# Patient Record
Sex: FEMALE | Race: BLACK OR AFRICAN AMERICAN | Employment: OTHER
[De-identification: names, ages, dates, MRNs, and addresses within clinical notes are randomized per-mention and may not be internally consistent; named-entity substitution may affect disease eponyms.]

---

## 2017-02-28 ENCOUNTER — OFFICE VISIT (OUTPATIENT)
Dept: FAMILY MEDICINE CLINIC | Facility: CLINIC | Age: 57
End: 2017-02-28

## 2017-02-28 VITALS
HEART RATE: 87 BPM | HEIGHT: 70 IN | SYSTOLIC BLOOD PRESSURE: 101 MMHG | BODY MASS INDEX: 18.47 KG/M2 | RESPIRATION RATE: 26 BRPM | OXYGEN SATURATION: 98 % | DIASTOLIC BLOOD PRESSURE: 68 MMHG | WEIGHT: 129 LBS

## 2017-02-28 DIAGNOSIS — G89.29 CHRONIC BACK PAIN, UNSPECIFIED BACK LOCATION, UNSPECIFIED BACK PAIN LATERALITY: ICD-10-CM

## 2017-02-28 DIAGNOSIS — M54.9 CHRONIC BACK PAIN, UNSPECIFIED BACK LOCATION, UNSPECIFIED BACK PAIN LATERALITY: ICD-10-CM

## 2017-02-28 DIAGNOSIS — Z00.00 PHYSICAL EXAM: ICD-10-CM

## 2017-02-28 DIAGNOSIS — N39.0 URINARY TRACT INFECTION WITHOUT HEMATURIA, SITE UNSPECIFIED: Primary | ICD-10-CM

## 2017-02-28 LAB
BILIRUBIN, POC: NORMAL
BLOOD URINE, POC: NORMAL
CLARITY, POC: CLEAR
COLOR, POC: NORMAL
GLUCOSE URINE, POC: NORMAL
KETONES, POC: NORMAL
LEUKOCYTE EST, POC: NORMAL
NITRITE, POC: NORMAL
PH, POC: 6
PROTEIN, POC: NORMAL
SPECIFIC GRAVITY, POC: <1.005
UROBILINOGEN, POC: 0.2

## 2017-02-28 PROCEDURE — 99396 PREV VISIT EST AGE 40-64: CPT | Performed by: NURSE PRACTITIONER

## 2017-02-28 PROCEDURE — 81002 URINALYSIS NONAUTO W/O SCOPE: CPT | Performed by: NURSE PRACTITIONER

## 2017-02-28 RX ORDER — GREEN TEA/HOODIA GORDONII 315-12.5MG
1 CAPSULE ORAL DAILY
Qty: 30 TABLET | Refills: 1 | Status: SHIPPED | OUTPATIENT
Start: 2017-02-28 | End: 2017-08-24 | Stop reason: SDUPTHER

## 2017-02-28 RX ORDER — SULFAMETHOXAZOLE AND TRIMETHOPRIM 200; 40 MG/5ML; MG/5ML
SUSPENSION ORAL
Refills: 0 | COMMUNITY
Start: 2017-02-23 | End: 2019-01-23 | Stop reason: ALTCHOICE

## 2017-02-28 ASSESSMENT — ENCOUNTER SYMPTOMS: BACK PAIN: 1

## 2017-03-05 ASSESSMENT — ENCOUNTER SYMPTOMS
CHEST TIGHTNESS: 0
RHINORRHEA: 0
COUGH: 0
CONSTIPATION: 0
ABDOMINAL PAIN: 0
BLOOD IN STOOL: 0
SINUS PRESSURE: 0
SHORTNESS OF BREATH: 0
DIARRHEA: 0
EYE DISCHARGE: 0

## 2017-03-10 ENCOUNTER — HOSPITAL ENCOUNTER (OUTPATIENT)
Dept: PHYSICAL THERAPY | Age: 57
Setting detail: THERAPIES SERIES
Discharge: HOME | End: 2017-03-10
Payer: COMMERCIAL

## 2017-03-10 PROCEDURE — 97162 PT EVAL MOD COMPLEX 30 MIN: CPT

## 2017-03-15 ASSESSMENT — PAIN SCALES - GENERAL: PAINLEVEL_OUTOF10: 7

## 2017-03-17 ENCOUNTER — HOSPITAL ENCOUNTER (OUTPATIENT)
Dept: PHYSICAL THERAPY | Age: 57
Setting detail: THERAPIES SERIES
Discharge: HOME | End: 2017-03-17
Payer: COMMERCIAL

## 2017-03-17 PROCEDURE — G0283 ELEC STIM OTHER THAN WOUND: HCPCS

## 2017-03-17 PROCEDURE — 97110 THERAPEUTIC EXERCISES: CPT

## 2017-03-17 ASSESSMENT — PAIN DESCRIPTION - LOCATION
LOCATION: BACK
LOCATION_2: HIP

## 2017-03-17 ASSESSMENT — PAIN SCALES - GENERAL: PAINLEVEL_OUTOF10: 5

## 2017-03-17 ASSESSMENT — PAIN DESCRIPTION - ORIENTATION
ORIENTATION: MID
ORIENTATION_2: LEFT

## 2017-03-17 ASSESSMENT — PAIN DESCRIPTION - INTENSITY: RATING_2: 5

## 2017-03-21 ENCOUNTER — HOSPITAL ENCOUNTER (OUTPATIENT)
Dept: PHYSICAL THERAPY | Age: 57
Setting detail: THERAPIES SERIES
Discharge: HOME | End: 2017-03-21
Payer: COMMERCIAL

## 2017-03-21 PROCEDURE — 97140 MANUAL THERAPY 1/> REGIONS: CPT

## 2017-03-21 PROCEDURE — G0283 ELEC STIM OTHER THAN WOUND: HCPCS

## 2017-03-21 PROCEDURE — 97110 THERAPEUTIC EXERCISES: CPT

## 2017-03-21 ASSESSMENT — PAIN DESCRIPTION - LOCATION
LOCATION_2: HIP
LOCATION: BACK

## 2017-03-21 ASSESSMENT — PAIN DESCRIPTION - ORIENTATION
ORIENTATION_2: LEFT
ORIENTATION: MID

## 2017-03-21 ASSESSMENT — PAIN DESCRIPTION - INTENSITY: RATING_2: 5

## 2017-03-21 ASSESSMENT — PAIN SCALES - GENERAL: PAINLEVEL_OUTOF10: 6

## 2017-03-23 ENCOUNTER — HOSPITAL ENCOUNTER (OUTPATIENT)
Dept: PHYSICAL THERAPY | Age: 57
Setting detail: THERAPIES SERIES
Discharge: HOME | End: 2017-03-23
Payer: COMMERCIAL

## 2017-03-23 PROCEDURE — G0283 ELEC STIM OTHER THAN WOUND: HCPCS

## 2017-03-23 PROCEDURE — 97110 THERAPEUTIC EXERCISES: CPT

## 2017-03-23 ASSESSMENT — PAIN DESCRIPTION - LOCATION
LOCATION: BACK
LOCATION_2: HIP

## 2017-03-23 ASSESSMENT — PAIN DESCRIPTION - INTENSITY: RATING_2: 5

## 2017-03-23 ASSESSMENT — PAIN DESCRIPTION - ORIENTATION
ORIENTATION: MID
ORIENTATION_2: LEFT

## 2017-03-23 ASSESSMENT — PAIN SCALES - GENERAL: PAINLEVEL_OUTOF10: 7

## 2017-03-28 ENCOUNTER — HOSPITAL ENCOUNTER (OUTPATIENT)
Dept: PHYSICAL THERAPY | Age: 57
Setting detail: THERAPIES SERIES
Discharge: HOME | End: 2017-03-28
Payer: COMMERCIAL

## 2017-03-28 PROCEDURE — G0283 ELEC STIM OTHER THAN WOUND: HCPCS

## 2017-03-28 PROCEDURE — 97110 THERAPEUTIC EXERCISES: CPT

## 2017-03-28 PROCEDURE — 97140 MANUAL THERAPY 1/> REGIONS: CPT

## 2017-03-29 ASSESSMENT — PAIN DESCRIPTION - ORIENTATION
ORIENTATION_2: LEFT
ORIENTATION: MID

## 2017-03-29 ASSESSMENT — PAIN DESCRIPTION - INTENSITY: RATING_2: 2

## 2017-03-29 ASSESSMENT — PAIN SCALES - GENERAL: PAINLEVEL_OUTOF10: 6

## 2017-03-29 ASSESSMENT — PAIN DESCRIPTION - LOCATION
LOCATION_2: HIP
LOCATION: BACK

## 2017-03-30 ENCOUNTER — HOSPITAL ENCOUNTER (OUTPATIENT)
Dept: PHYSICAL THERAPY | Age: 57
Setting detail: THERAPIES SERIES
Discharge: HOME | End: 2017-03-30
Payer: COMMERCIAL

## 2017-03-30 PROCEDURE — 97110 THERAPEUTIC EXERCISES: CPT

## 2017-03-30 PROCEDURE — G0283 ELEC STIM OTHER THAN WOUND: HCPCS

## 2017-03-30 ASSESSMENT — PAIN SCALES - GENERAL: PAINLEVEL_OUTOF10: 6

## 2017-03-30 ASSESSMENT — PAIN DESCRIPTION - LOCATION
LOCATION: BACK
LOCATION_2: HIP

## 2017-03-30 ASSESSMENT — PAIN DESCRIPTION - INTENSITY: RATING_2: 2

## 2017-03-30 ASSESSMENT — PAIN DESCRIPTION - ORIENTATION
ORIENTATION: MID
ORIENTATION_2: LEFT

## 2017-04-05 ENCOUNTER — HOSPITAL ENCOUNTER (OUTPATIENT)
Dept: PHYSICAL THERAPY | Age: 57
Setting detail: THERAPIES SERIES
Discharge: HOME | End: 2017-04-05
Payer: MEDICARE

## 2017-04-05 PROCEDURE — 97110 THERAPEUTIC EXERCISES: CPT

## 2017-04-05 PROCEDURE — 97032 APPL MODALITY 1+ESTIM EA 15: CPT

## 2017-04-05 PROCEDURE — G0283 ELEC STIM OTHER THAN WOUND: HCPCS

## 2017-04-06 ENCOUNTER — HOSPITAL ENCOUNTER (OUTPATIENT)
Dept: PHYSICAL THERAPY | Age: 57
Setting detail: THERAPIES SERIES
End: 2017-04-06
Payer: MEDICARE

## 2017-04-06 ASSESSMENT — PAIN DESCRIPTION - LOCATION
LOCATION_2: HIP
LOCATION: BACK

## 2017-04-06 ASSESSMENT — PAIN DESCRIPTION - ORIENTATION
ORIENTATION_2: LEFT
ORIENTATION: MID

## 2017-04-06 ASSESSMENT — PAIN SCALES - GENERAL: PAINLEVEL_OUTOF10: 5

## 2017-04-06 ASSESSMENT — PAIN DESCRIPTION - INTENSITY: RATING_2: 4

## 2017-04-11 ENCOUNTER — HOSPITAL ENCOUNTER (OUTPATIENT)
Dept: PHYSICAL THERAPY | Age: 57
Setting detail: THERAPIES SERIES
Discharge: HOME | End: 2017-04-11
Payer: MEDICARE

## 2017-04-11 PROCEDURE — G0283 ELEC STIM OTHER THAN WOUND: HCPCS

## 2017-04-11 PROCEDURE — 97110 THERAPEUTIC EXERCISES: CPT

## 2017-04-12 ASSESSMENT — PAIN DESCRIPTION - LOCATION
LOCATION_2: HIP
LOCATION: BACK

## 2017-04-12 ASSESSMENT — PAIN DESCRIPTION - ORIENTATION
ORIENTATION_2: LEFT
ORIENTATION: MID

## 2017-04-12 ASSESSMENT — PAIN SCALES - GENERAL: PAINLEVEL_OUTOF10: 5

## 2017-04-12 ASSESSMENT — PAIN DESCRIPTION - INTENSITY: RATING_2: 4

## 2017-04-13 ENCOUNTER — HOSPITAL ENCOUNTER (OUTPATIENT)
Dept: PHYSICAL THERAPY | Age: 57
Setting detail: THERAPIES SERIES
Discharge: HOME | End: 2017-04-13
Payer: MEDICARE

## 2017-04-13 PROCEDURE — 97110 THERAPEUTIC EXERCISES: CPT

## 2017-04-13 PROCEDURE — G0283 ELEC STIM OTHER THAN WOUND: HCPCS

## 2017-04-13 PROCEDURE — 97140 MANUAL THERAPY 1/> REGIONS: CPT

## 2017-04-17 ASSESSMENT — PAIN SCALES - GENERAL: PAINLEVEL_OUTOF10: 5

## 2017-04-17 ASSESSMENT — PAIN DESCRIPTION - ORIENTATION
ORIENTATION_2: LEFT
ORIENTATION: MID

## 2017-04-17 ASSESSMENT — PAIN DESCRIPTION - LOCATION
LOCATION_2: HIP
LOCATION: BACK

## 2017-04-17 ASSESSMENT — PAIN DESCRIPTION - INTENSITY: RATING_2: 4

## 2017-04-19 ENCOUNTER — HOSPITAL ENCOUNTER (OUTPATIENT)
Dept: PHYSICAL THERAPY | Age: 57
Setting detail: THERAPIES SERIES
Discharge: HOME | End: 2017-04-19
Payer: MEDICARE

## 2017-04-21 ENCOUNTER — HOSPITAL ENCOUNTER (OUTPATIENT)
Dept: PHYSICAL THERAPY | Age: 57
Setting detail: THERAPIES SERIES
Discharge: HOME | End: 2017-04-21
Payer: MEDICARE

## 2017-04-21 PROCEDURE — 97110 THERAPEUTIC EXERCISES: CPT

## 2017-04-21 PROCEDURE — G0283 ELEC STIM OTHER THAN WOUND: HCPCS

## 2017-04-24 ASSESSMENT — PAIN DESCRIPTION - ORIENTATION
ORIENTATION: MID
ORIENTATION_2: LEFT

## 2017-04-24 ASSESSMENT — PAIN DESCRIPTION - LOCATION
LOCATION: BACK
LOCATION_2: HIP

## 2017-04-24 ASSESSMENT — PAIN DESCRIPTION - INTENSITY: RATING_2: 3

## 2017-04-24 ASSESSMENT — PAIN SCALES - GENERAL: PAINLEVEL_OUTOF10: 6

## 2017-04-27 ENCOUNTER — TELEPHONE (OUTPATIENT)
Dept: FAMILY MEDICINE CLINIC | Age: 57
End: 2017-04-27

## 2017-04-28 ENCOUNTER — HOSPITAL ENCOUNTER (OUTPATIENT)
Dept: PHYSICAL THERAPY | Age: 57
Setting detail: THERAPIES SERIES
Discharge: HOME | End: 2017-04-28
Payer: MEDICARE

## 2017-04-28 PROCEDURE — 97110 THERAPEUTIC EXERCISES: CPT

## 2017-04-28 PROCEDURE — G0283 ELEC STIM OTHER THAN WOUND: HCPCS

## 2017-05-01 ASSESSMENT — PAIN DESCRIPTION - INTENSITY: RATING_2: 2

## 2017-05-01 ASSESSMENT — PAIN DESCRIPTION - LOCATION
LOCATION: BACK
LOCATION_2: HIP

## 2017-05-01 ASSESSMENT — PAIN DESCRIPTION - ORIENTATION
ORIENTATION_2: LEFT
ORIENTATION: MID

## 2017-05-01 ASSESSMENT — PAIN SCALES - GENERAL: PAINLEVEL_OUTOF10: 6

## 2017-05-02 ENCOUNTER — HOSPITAL ENCOUNTER (OUTPATIENT)
Dept: PHYSICAL THERAPY | Age: 57
Setting detail: THERAPIES SERIES
End: 2017-05-02
Payer: MEDICARE

## 2017-05-03 ENCOUNTER — HOSPITAL ENCOUNTER (OUTPATIENT)
Dept: PHYSICAL THERAPY | Age: 57
Setting detail: THERAPIES SERIES
Discharge: HOME | End: 2017-05-03
Payer: MEDICARE

## 2017-05-03 PROCEDURE — G0283 ELEC STIM OTHER THAN WOUND: HCPCS

## 2017-05-03 PROCEDURE — 97110 THERAPEUTIC EXERCISES: CPT

## 2017-05-04 ENCOUNTER — HOSPITAL ENCOUNTER (OUTPATIENT)
Dept: PHYSICAL THERAPY | Age: 57
Setting detail: THERAPIES SERIES
Discharge: HOME | End: 2017-05-04
Payer: MEDICARE

## 2017-05-04 PROCEDURE — 97110 THERAPEUTIC EXERCISES: CPT

## 2017-05-04 PROCEDURE — G0283 ELEC STIM OTHER THAN WOUND: HCPCS

## 2017-05-04 ASSESSMENT — PAIN DESCRIPTION - LOCATION
LOCATION: BACK
LOCATION_2: HIP
LOCATION: BACK
LOCATION_2: HIP

## 2017-05-04 ASSESSMENT — PAIN SCALES - GENERAL
PAINLEVEL_OUTOF10: 7
PAINLEVEL_OUTOF10: 5

## 2017-05-04 ASSESSMENT — PAIN DESCRIPTION - INTENSITY
RATING_2: 2
RATING_2: 2

## 2017-05-04 ASSESSMENT — PAIN DESCRIPTION - ORIENTATION
ORIENTATION_2: LEFT
ORIENTATION: MID
ORIENTATION: MID
ORIENTATION_2: LEFT

## 2017-05-09 ENCOUNTER — HOSPITAL ENCOUNTER (OUTPATIENT)
Dept: PHYSICAL THERAPY | Age: 57
Setting detail: THERAPIES SERIES
Discharge: HOME | End: 2017-05-09
Payer: MEDICARE

## 2017-05-09 PROCEDURE — G0283 ELEC STIM OTHER THAN WOUND: HCPCS

## 2017-05-09 PROCEDURE — 97110 THERAPEUTIC EXERCISES: CPT

## 2017-05-09 ASSESSMENT — PAIN DESCRIPTION - ORIENTATION
ORIENTATION: MID
ORIENTATION_2: LEFT

## 2017-05-09 ASSESSMENT — PAIN DESCRIPTION - LOCATION
LOCATION: BACK
LOCATION_2: HIP

## 2017-05-09 ASSESSMENT — PAIN DESCRIPTION - INTENSITY: RATING_2: 2

## 2017-05-09 ASSESSMENT — PAIN SCALES - GENERAL: PAINLEVEL_OUTOF10: 6

## 2017-05-09 ASSESSMENT — PAIN DESCRIPTION - DESCRIPTORS: DESCRIPTORS_2: ACHING

## 2017-05-11 ENCOUNTER — HOSPITAL ENCOUNTER (OUTPATIENT)
Dept: PHYSICAL THERAPY | Age: 57
Setting detail: THERAPIES SERIES
Discharge: HOME | End: 2017-05-11
Payer: MEDICARE

## 2017-05-11 PROCEDURE — 97110 THERAPEUTIC EXERCISES: CPT

## 2017-05-11 PROCEDURE — G0283 ELEC STIM OTHER THAN WOUND: HCPCS

## 2017-05-11 ASSESSMENT — PAIN DESCRIPTION - ORIENTATION
ORIENTATION: MID
ORIENTATION_2: LEFT

## 2017-05-11 ASSESSMENT — PAIN SCALES - GENERAL: PAINLEVEL_OUTOF10: 7

## 2017-05-11 ASSESSMENT — PAIN DESCRIPTION - LOCATION
LOCATION: BACK
LOCATION_2: HIP

## 2017-05-11 ASSESSMENT — PAIN DESCRIPTION - INTENSITY: RATING_2: 5

## 2017-05-11 ASSESSMENT — PAIN DESCRIPTION - DESCRIPTORS: DESCRIPTORS_2: ACHING

## 2017-05-16 ENCOUNTER — HOSPITAL ENCOUNTER (OUTPATIENT)
Dept: PHYSICAL THERAPY | Age: 57
Setting detail: THERAPIES SERIES
Discharge: HOME | End: 2017-05-16
Payer: MEDICARE

## 2017-05-16 PROCEDURE — G0283 ELEC STIM OTHER THAN WOUND: HCPCS

## 2017-05-16 PROCEDURE — 97110 THERAPEUTIC EXERCISES: CPT

## 2017-05-16 ASSESSMENT — PAIN DESCRIPTION - LOCATION
LOCATION: BACK
LOCATION_2: HIP

## 2017-05-16 ASSESSMENT — PAIN DESCRIPTION - ORIENTATION
ORIENTATION_2: LEFT
ORIENTATION: MID

## 2017-05-16 ASSESSMENT — PAIN DESCRIPTION - INTENSITY: RATING_2: 4

## 2017-05-16 ASSESSMENT — PAIN SCALES - GENERAL: PAINLEVEL_OUTOF10: 7

## 2017-05-18 ENCOUNTER — HOSPITAL ENCOUNTER (OUTPATIENT)
Dept: PHYSICAL THERAPY | Age: 57
Setting detail: THERAPIES SERIES
Discharge: HOME | End: 2017-05-18
Payer: MEDICARE

## 2017-06-28 RX ORDER — IBUPROFEN 800 MG/1
TABLET ORAL
Qty: 90 TABLET | Refills: 3 | Status: SHIPPED | OUTPATIENT
Start: 2017-06-28 | End: 2019-01-23 | Stop reason: SDUPTHER

## 2017-11-02 RX ORDER — FLUTICASONE PROPIONATE 50 MCG
SPRAY, SUSPENSION (ML) NASAL
Qty: 1 BOTTLE | Refills: 0 | Status: SHIPPED | OUTPATIENT
Start: 2017-11-02 | End: 2018-03-26 | Stop reason: SDUPTHER

## 2017-11-02 NOTE — TELEPHONE ENCOUNTER
LAST APPT WAS 2/28/17  PT NEEDS APPT  PUT IN FOR 1 REFILL ONLY    Next Visit Date:  No future appointments.     Health Maintenance   Topic Date Due    Hepatitis C screen  1960    DTaP/Tdap/Td vaccine (1 - Tdap) 01/01/1981    Flu vaccine (1) 09/01/2017    Breast cancer screen  06/13/2018    Lipid screen  07/09/2020    Colon cancer screen colonoscopy  08/30/2023    HIV screen  Completed       Hemoglobin A1C (%)   Date Value   04/30/2013 5.5             ( goal A1C is < 7)   No results found for: LABMICR  LDL Cholesterol (mg/dL)   Date Value   07/09/2015 115       (goal LDL is <100)   AST (U/L)   Date Value   07/09/2015 22     ALT (U/L)   Date Value   07/09/2015 22     BUN (mg/dL)   Date Value   11/19/2015 18     BP Readings from Last 3 Encounters:   02/28/17 101/68   06/13/16 124/71   05/16/16 110/68          (goal 120/80)    All Future Testing planned in [facility]  Lab Frequency Next Occurrence               Patient Active Problem List:     Anemia     Encounter for blood transfusion     Hyperlipidemia     Back pain     Hip pain     Degeneration of lumbar or lumbosacral intervertebral disc     Lumbar spondylosis     Lumbar facet arthropathy     Chronic low back pain     Left hip pain     Spondylosis of lumbar region without myelopathy or radiculopathy     Chronic bilateral low back pain without sciatica     Encounter for pain management planning     Ganglion of hip

## 2017-11-06 RX ORDER — ERYTHROMYCIN 5 MG/G
OINTMENT OPHTHALMIC
Qty: 3.5 G | Refills: 0 | Status: SHIPPED | OUTPATIENT
Start: 2017-11-06 | End: 2019-08-06 | Stop reason: ALTCHOICE

## 2017-11-06 NOTE — TELEPHONE ENCOUNTER
Last visit 2/28/17  Next visit 11/7/17      Next Visit Date:  Future Appointments  Date Time Provider Derrell Castro   11/7/2017 1:30 PM Wesley King CNP [facility] [address] Maintenance   Topic Date Due    Hepatitis C screen  1960    DTaP/Tdap/Td vaccine (1 - Tdap) 01/01/1981    Flu vaccine (1) 09/01/2017    Breast cancer screen  06/13/2018    Lipid screen  07/09/2020    Colon cancer screen colonoscopy  08/30/2023    HIV screen  Completed       Hemoglobin A1C (%)   Date Value   04/30/2013 5.5             ( goal A1C is < 7)   No results found for: LABMICR  LDL Cholesterol (mg/dL)   Date Value   07/09/2015 115       (goal LDL is <100)   AST (U/L)   Date Value   07/09/2015 22     ALT (U/L)   Date Value   07/09/2015 22     BUN (mg/dL)   Date Value   11/19/2015 18     BP Readings from Last 3 Encounters:   02/28/17 101/68   06/13/16 124/71   05/16/16 110/68          (goal 120/80)    All Future Testing planned in [facility]  Lab Frequency Next Occurrence               Patient Active Problem List:     Anemia     Encounter for blood transfusion     Hyperlipidemia     Back pain     Hip pain     Degeneration of lumbar or lumbosacral intervertebral disc     Lumbar spondylosis     Lumbar facet arthropathy     Chronic low back pain     Left hip pain     Spondylosis of lumbar region without myelopathy or radiculopathy     Chronic bilateral low back pain without sciatica     Encounter for pain management planning     Ganglion of hip

## 2017-11-07 ENCOUNTER — OFFICE VISIT (OUTPATIENT)
Dept: FAMILY MEDICINE CLINIC | Age: 57
End: 2017-11-07
Payer: COMMERCIAL

## 2017-11-07 VITALS
HEART RATE: 70 BPM | DIASTOLIC BLOOD PRESSURE: 70 MMHG | RESPIRATION RATE: 16 BRPM | HEIGHT: 70 IN | TEMPERATURE: 97.2 F | BODY MASS INDEX: 19.13 KG/M2 | SYSTOLIC BLOOD PRESSURE: 100 MMHG | WEIGHT: 133.6 LBS | OXYGEN SATURATION: 100 %

## 2017-11-07 DIAGNOSIS — M54.50 CHRONIC BILATERAL LOW BACK PAIN WITHOUT SCIATICA: ICD-10-CM

## 2017-11-07 DIAGNOSIS — M67.452: ICD-10-CM

## 2017-11-07 DIAGNOSIS — G89.29 CHRONIC BILATERAL LOW BACK PAIN WITHOUT SCIATICA: ICD-10-CM

## 2017-11-07 DIAGNOSIS — M51.37 DEGENERATION OF LUMBAR OR LUMBOSACRAL INTERVERTEBRAL DISC: Primary | ICD-10-CM

## 2017-11-07 DIAGNOSIS — M25.552 ARTHRALGIA OF LEFT HIP: ICD-10-CM

## 2017-11-07 DIAGNOSIS — R30.0 DYSURIA: ICD-10-CM

## 2017-11-07 LAB
BILIRUBIN, POC: NORMAL
BLOOD URINE, POC: NORMAL
CLARITY, POC: CLEAR
COLOR, POC: NORMAL
GLUCOSE URINE, POC: NORMAL
KETONES, POC: NORMAL
LEUKOCYTE EST, POC: NORMAL
NITRITE, POC: NORMAL
PH, POC: 6.5
PROTEIN, POC: NORMAL
SPECIFIC GRAVITY, POC: 1.01
UROBILINOGEN, POC: 1

## 2017-11-07 PROCEDURE — 3014F SCREEN MAMMO DOC REV: CPT | Performed by: NURSE PRACTITIONER

## 2017-11-07 PROCEDURE — 1036F TOBACCO NON-USER: CPT | Performed by: NURSE PRACTITIONER

## 2017-11-07 PROCEDURE — G8427 DOCREV CUR MEDS BY ELIG CLIN: HCPCS | Performed by: NURSE PRACTITIONER

## 2017-11-07 PROCEDURE — 3017F COLORECTAL CA SCREEN DOC REV: CPT | Performed by: NURSE PRACTITIONER

## 2017-11-07 PROCEDURE — G8484 FLU IMMUNIZE NO ADMIN: HCPCS | Performed by: NURSE PRACTITIONER

## 2017-11-07 PROCEDURE — G8420 CALC BMI NORM PARAMETERS: HCPCS | Performed by: NURSE PRACTITIONER

## 2017-11-07 PROCEDURE — 81003 URINALYSIS AUTO W/O SCOPE: CPT | Performed by: NURSE PRACTITIONER

## 2017-11-07 PROCEDURE — 99214 OFFICE O/P EST MOD 30 MIN: CPT | Performed by: NURSE PRACTITIONER

## 2017-11-07 RX ORDER — CYCLOBENZAPRINE HCL 10 MG
TABLET ORAL
Qty: 90 TABLET | Refills: 1 | Status: SHIPPED | OUTPATIENT
Start: 2017-11-07 | End: 2019-04-03 | Stop reason: SDUPTHER

## 2017-11-07 RX ORDER — TRAMADOL HYDROCHLORIDE 50 MG/1
TABLET ORAL
Qty: 30 TABLET | Refills: 0 | Status: SHIPPED | OUTPATIENT
Start: 2017-11-07 | End: 2019-01-23 | Stop reason: ALTCHOICE

## 2017-11-07 ASSESSMENT — ENCOUNTER SYMPTOMS: BACK PAIN: 1

## 2017-11-07 NOTE — TELEPHONE ENCOUNTER
pt states she discussesd having lidocaine cream called in. Pt wants to use for LBP and pain in left hip. Pt states lidocaine patches were denied by insurance.

## 2017-11-07 NOTE — PROGRESS NOTES
Patient is here for a prescription refill. Today she is experiencing some lower back/left side pain. Pt has been taking ibuprofen 800 for the pain. UA was performed. Pt would like lidocaine patch reordered but I get a message saying its not covered and to try the cream first.  Please try. Pt would like xray of back. No other concerns at this time. Med and pharmacy reviewed. Visit Information    Have you changed or started any medications since your last visit including any over-the-counter medicines, vitamins, or herbal medicines? no   Have you stopped taking any of your medications? Is so, why? -  no  Are you having any side effects from any of your medications? - no    Have you seen any other physician or provider since your last visit?  no   Have you had any other diagnostic tests since your last visit?  no   Have you been seen in the emergency room and/or had an admission in a hospital since we last saw you?  yes - urgent care Oct   Have you had your routine dental cleaning in the past 6 months?  no     Do you have an active MyChart account? If no, what is the barrier?   yes    Patient Care Team:  Wesley King CNP as PCP - General (Certified Nurse Practitioner)  Yocasta Malik MD as Consulting Physician (Gastroenterology)  Kiersten Malin MD as Surgeon (Orthopedic Surgery)  Jade Bee MD    Medical History Review  Past Medical, Family, and Social History reviewed and does not contribute to the patient presenting condition    Health Maintenance   Topic Date Due    Hepatitis C screen  1960    DTaP/Tdap/Td vaccine (1 - Tdap) 01/01/1981    Flu vaccine (1) 09/01/2017    Breast cancer screen  06/13/2018    Lipid screen  07/09/2020    Colon cancer screen colonoscopy  08/30/2023    HIV screen  Completed

## 2017-11-08 RX ORDER — LIDOCAINE 40 MG/G
CREAM TOPICAL
Qty: 1 TUBE | Refills: 1 | Status: SHIPPED | OUTPATIENT
Start: 2017-11-08 | End: 2018-02-12 | Stop reason: SDUPTHER

## 2017-11-09 ENCOUNTER — TELEPHONE (OUTPATIENT)
Dept: FAMILY MEDICINE CLINIC | Age: 57
End: 2017-11-09

## 2017-11-21 ENCOUNTER — HOSPITAL ENCOUNTER (OUTPATIENT)
Dept: MRI IMAGING | Age: 57
Discharge: HOME | End: 2017-11-21
Payer: COMMERCIAL

## 2017-11-21 DIAGNOSIS — M67.452: ICD-10-CM

## 2017-11-21 DIAGNOSIS — M25.552 ARTHRALGIA OF LEFT HIP: ICD-10-CM

## 2017-11-21 PROCEDURE — 73721 MRI JNT OF LWR EXTRE W/O DYE: CPT

## 2017-12-01 DIAGNOSIS — M47.816 LUMBAR SPONDYLOSIS: Primary | ICD-10-CM

## 2017-12-01 DIAGNOSIS — M51.37 DEGENERATION OF LUMBAR OR LUMBOSACRAL INTERVERTEBRAL DISC: ICD-10-CM

## 2017-12-11 ENCOUNTER — TELEPHONE (OUTPATIENT)
Dept: FAMILY MEDICINE CLINIC | Age: 57
End: 2017-12-11

## 2017-12-15 DIAGNOSIS — M25.552 ARTHRALGIA OF LEFT HIP: ICD-10-CM

## 2017-12-15 DIAGNOSIS — M51.37 DEGENERATION OF LUMBAR OR LUMBOSACRAL INTERVERTEBRAL DISC: ICD-10-CM

## 2017-12-19 RX ORDER — CAPSAICIN 0.025 %
CREAM (GRAM) TOPICAL
Qty: 1 TUBE | Refills: 5 | Status: SHIPPED | OUTPATIENT
Start: 2017-12-19 | End: 2019-05-28 | Stop reason: SDUPTHER

## 2018-02-13 RX ORDER — LIDOCAINE 40 MG/G
CREAM TOPICAL
Qty: 15 G | Refills: 1 | Status: SHIPPED | OUTPATIENT
Start: 2018-02-13 | End: 2018-11-03 | Stop reason: SDUPTHER

## 2018-03-26 RX ORDER — FLUTICASONE PROPIONATE 50 MCG
SPRAY, SUSPENSION (ML) NASAL
Qty: 16 G | Refills: 5 | Status: SHIPPED | OUTPATIENT
Start: 2018-03-26 | End: 2019-04-02 | Stop reason: SDUPTHER

## 2018-03-26 NOTE — TELEPHONE ENCOUNTER
Last seen 11/7/17    Next Visit Date:  No future appointments.     Health Maintenance   Topic Date Due    Hepatitis C screen  1960    Shingles Vaccine (1 of 2 - 2 Dose Series) 01/01/2012    DTaP/Tdap/Td vaccine (1 - Tdap) 11/07/2018 (Originally 1/1/1981)    Flu vaccine (1) 11/07/2018 (Originally 9/1/2017)    Breast cancer screen  06/13/2018    Lipid screen  07/09/2020    Colon cancer screen colonoscopy  08/30/2023    HIV screen  Completed       Hemoglobin A1C (%)   Date Value   04/30/2013 5.5             ( goal A1C is < 7)   No results found for: LABMICR  LDL Cholesterol (mg/dL)   Date Value   07/09/2015 115       (goal LDL is <100)   AST (U/L)   Date Value   07/09/2015 22     ALT (U/L)   Date Value   07/09/2015 22     BUN (mg/dL)   Date Value   11/19/2015 18     BP Readings from Last 3 Encounters:   11/07/17 100/70   02/28/17 101/68   06/13/16 124/71          (goal 120/80)    All Future Testing planned in [facility]  Lab Frequency Next Occurrence               Patient Active Problem List:     Anemia     Encounter for blood transfusion     Hyperlipidemia     Back pain     Hip pain     Degeneration of lumbar or lumbosacral intervertebral disc     Lumbar spondylosis     Lumbar facet arthropathy     Chronic low back pain     Left hip pain     Spondylosis of lumbar region without myelopathy or radiculopathy     Chronic bilateral low back pain without sciatica     Encounter for pain management planning     Ganglion of hip

## 2018-04-17 ENCOUNTER — TELEPHONE (OUTPATIENT)
Dept: FAMILY MEDICINE CLINIC | Age: 58
End: 2018-04-17

## 2018-04-17 DIAGNOSIS — Z12.31 VISIT FOR SCREENING MAMMOGRAM: Primary | ICD-10-CM

## 2018-05-07 ENCOUNTER — HOSPITAL ENCOUNTER (OUTPATIENT)
Dept: WOMENS IMAGING | Age: 58
Discharge: HOME | End: 2018-05-09
Payer: COMMERCIAL

## 2018-05-07 DIAGNOSIS — Z12.31 VISIT FOR SCREENING MAMMOGRAM: ICD-10-CM

## 2018-05-07 PROCEDURE — 77067 SCR MAMMO BI INCL CAD: CPT

## 2018-06-15 RX ORDER — LORATADINE 10 MG/1
TABLET ORAL
Qty: 30 TABLET | Refills: 5 | Status: SHIPPED | OUTPATIENT
Start: 2018-06-15 | End: 2020-03-13 | Stop reason: ALTCHOICE

## 2018-06-20 ENCOUNTER — OFFICE VISIT (OUTPATIENT)
Dept: FAMILY MEDICINE CLINIC | Age: 58
End: 2018-06-20
Payer: COMMERCIAL

## 2018-06-20 VITALS
HEIGHT: 70 IN | WEIGHT: 133.2 LBS | BODY MASS INDEX: 19.07 KG/M2 | SYSTOLIC BLOOD PRESSURE: 112 MMHG | HEART RATE: 67 BPM | DIASTOLIC BLOOD PRESSURE: 72 MMHG

## 2018-06-20 DIAGNOSIS — R63.6 UNDERWEIGHT ON EXAMINATION: ICD-10-CM

## 2018-06-20 DIAGNOSIS — Z00.00 ROUTINE GENERAL MEDICAL EXAMINATION AT A HEALTH CARE FACILITY: Primary | ICD-10-CM

## 2018-06-20 PROCEDURE — G0438 PPPS, INITIAL VISIT: HCPCS | Performed by: NURSE PRACTITIONER

## 2018-06-20 ASSESSMENT — LIFESTYLE VARIABLES: HOW OFTEN DO YOU HAVE A DRINK CONTAINING ALCOHOL: 0

## 2018-06-20 ASSESSMENT — ANXIETY QUESTIONNAIRES: GAD7 TOTAL SCORE: 4

## 2018-07-19 ENCOUNTER — HOSPITAL ENCOUNTER (OUTPATIENT)
Dept: NUTRITION | Age: 58
Discharge: HOME | End: 2018-07-19
Payer: COMMERCIAL

## 2018-07-19 PROCEDURE — 97802 MEDICAL NUTRITION INDIV IN: CPT | Performed by: DIETITIAN, REGISTERED

## 2018-07-27 ENCOUNTER — TELEPHONE (OUTPATIENT)
Dept: FAMILY MEDICINE CLINIC | Age: 58
End: 2018-07-27

## 2018-08-07 DIAGNOSIS — M54.50 CHRONIC BILATERAL LOW BACK PAIN WITHOUT SCIATICA: Primary | ICD-10-CM

## 2018-08-07 DIAGNOSIS — G89.29 CHRONIC BILATERAL LOW BACK PAIN WITHOUT SCIATICA: Primary | ICD-10-CM

## 2018-10-10 ENCOUNTER — TELEPHONE (OUTPATIENT)
Dept: FAMILY MEDICINE CLINIC | Age: 58
End: 2018-10-10

## 2019-01-23 ENCOUNTER — OFFICE VISIT (OUTPATIENT)
Dept: FAMILY MEDICINE CLINIC | Age: 59
End: 2019-01-23
Payer: COMMERCIAL

## 2019-01-23 ENCOUNTER — HOSPITAL ENCOUNTER (OUTPATIENT)
Age: 59
Setting detail: SPECIMEN
Discharge: HOME | End: 2019-01-23
Payer: COMMERCIAL

## 2019-01-23 VITALS
OXYGEN SATURATION: 99 % | BODY MASS INDEX: 18.55 KG/M2 | TEMPERATURE: 97.8 F | SYSTOLIC BLOOD PRESSURE: 100 MMHG | WEIGHT: 131 LBS | DIASTOLIC BLOOD PRESSURE: 60 MMHG | HEART RATE: 80 BPM

## 2019-01-23 DIAGNOSIS — R20.2 NOTALGIA PARESTHETICA: ICD-10-CM

## 2019-01-23 DIAGNOSIS — R53.83 FATIGUE, UNSPECIFIED TYPE: ICD-10-CM

## 2019-01-23 DIAGNOSIS — M47.816 SPONDYLOSIS OF LUMBAR REGION WITHOUT MYELOPATHY OR RADICULOPATHY: ICD-10-CM

## 2019-01-23 DIAGNOSIS — M19.049 ARTHRITIS PAIN OF HAND: ICD-10-CM

## 2019-01-23 DIAGNOSIS — R55 SYNCOPE, UNSPECIFIED SYNCOPE TYPE: Primary | ICD-10-CM

## 2019-01-23 DIAGNOSIS — G89.29 CHRONIC BILATERAL LOW BACK PAIN WITHOUT SCIATICA: ICD-10-CM

## 2019-01-23 DIAGNOSIS — E78.5 HYPERLIPIDEMIA, UNSPECIFIED HYPERLIPIDEMIA TYPE: ICD-10-CM

## 2019-01-23 DIAGNOSIS — Z79.899 MEDICATION MANAGEMENT: ICD-10-CM

## 2019-01-23 DIAGNOSIS — M54.50 CHRONIC BILATERAL LOW BACK PAIN WITHOUT SCIATICA: ICD-10-CM

## 2019-01-23 DIAGNOSIS — R55 SYNCOPE, UNSPECIFIED SYNCOPE TYPE: ICD-10-CM

## 2019-01-23 DIAGNOSIS — M25.552 LEFT HIP PAIN: ICD-10-CM

## 2019-01-23 LAB
ALBUMIN SERPL-MCNC: 4.7 G/DL (ref 3.5–5.2)
ALBUMIN/GLOBULIN RATIO: 1.5 (ref 1–2.5)
ALP BLD-CCNC: 67 U/L (ref 35–104)
ALT SERPL-CCNC: 24 U/L (ref 5–33)
ANION GAP SERPL CALCULATED.3IONS-SCNC: 23 MMOL/L (ref 9–17)
AST SERPL-CCNC: 27 U/L
BILIRUB SERPL-MCNC: 1.14 MG/DL (ref 0.3–1.2)
BUN BLDV-MCNC: 17 MG/DL (ref 6–20)
BUN/CREAT BLD: ABNORMAL (ref 9–20)
C-REACTIVE PROTEIN: <0.3 MG/L (ref 0–5)
CALCIUM SERPL-MCNC: 9.7 MG/DL (ref 8.6–10.4)
CHLORIDE BLD-SCNC: 102 MMOL/L (ref 98–107)
CHOLESTEROL/HDL RATIO: 2.7
CHOLESTEROL: 235 MG/DL
CO2: 23 MMOL/L (ref 20–31)
CREAT SERPL-MCNC: 0.79 MG/DL (ref 0.5–0.9)
GFR AFRICAN AMERICAN: >60 ML/MIN
GFR NON-AFRICAN AMERICAN: >60 ML/MIN
GFR SERPL CREATININE-BSD FRML MDRD: ABNORMAL ML/MIN/{1.73_M2}
GFR SERPL CREATININE-BSD FRML MDRD: ABNORMAL ML/MIN/{1.73_M2}
GLUCOSE BLD-MCNC: 72 MG/DL (ref 70–99)
HCT VFR BLD CALC: 42.9 % (ref 36.3–47.1)
HDLC SERPL-MCNC: 86 MG/DL
HEMOGLOBIN: 13.5 G/DL (ref 11.9–15.1)
LDL CHOLESTEROL: 129 MG/DL (ref 0–130)
MCH RBC QN AUTO: 29.9 PG (ref 25.2–33.5)
MCHC RBC AUTO-ENTMCNC: 31.5 G/DL (ref 28.4–34.8)
MCV RBC AUTO: 94.9 FL (ref 82.6–102.9)
NRBC AUTOMATED: 0 PER 100 WBC
PDW BLD-RTO: 12.4 % (ref 11.8–14.4)
PLATELET # BLD: 161 K/UL (ref 138–453)
PMV BLD AUTO: 12.4 FL (ref 8.1–13.5)
POTASSIUM SERPL-SCNC: 4.5 MMOL/L (ref 3.7–5.3)
RBC # BLD: 4.52 M/UL (ref 3.95–5.11)
SODIUM BLD-SCNC: 148 MMOL/L (ref 135–144)
TOTAL PROTEIN: 7.9 G/DL (ref 6.4–8.3)
TRIGL SERPL-MCNC: 102 MG/DL
TSH SERPL DL<=0.05 MIU/L-ACNC: 1.59 MIU/L (ref 0.3–5)
VLDLC SERPL CALC-MCNC: ABNORMAL MG/DL (ref 1–30)
WBC # BLD: 3.1 K/UL (ref 3.5–11.3)

## 2019-01-23 PROCEDURE — G8427 DOCREV CUR MEDS BY ELIG CLIN: HCPCS | Performed by: NURSE PRACTITIONER

## 2019-01-23 PROCEDURE — 1036F TOBACCO NON-USER: CPT | Performed by: NURSE PRACTITIONER

## 2019-01-23 PROCEDURE — G8484 FLU IMMUNIZE NO ADMIN: HCPCS | Performed by: NURSE PRACTITIONER

## 2019-01-23 PROCEDURE — 99215 OFFICE O/P EST HI 40 MIN: CPT | Performed by: NURSE PRACTITIONER

## 2019-01-23 PROCEDURE — 3017F COLORECTAL CA SCREEN DOC REV: CPT | Performed by: NURSE PRACTITIONER

## 2019-01-23 PROCEDURE — 36415 COLL VENOUS BLD VENIPUNCTURE: CPT | Performed by: NURSE PRACTITIONER

## 2019-01-23 PROCEDURE — G8420 CALC BMI NORM PARAMETERS: HCPCS | Performed by: NURSE PRACTITIONER

## 2019-01-23 ASSESSMENT — PATIENT HEALTH QUESTIONNAIRE - PHQ9
SUM OF ALL RESPONSES TO PHQ9 QUESTIONS 1 & 2: 2
SUM OF ALL RESPONSES TO PHQ QUESTIONS 1-9: 2
1. LITTLE INTEREST OR PLEASURE IN DOING THINGS: 1
2. FEELING DOWN, DEPRESSED OR HOPELESS: 1
SUM OF ALL RESPONSES TO PHQ QUESTIONS 1-9: 2

## 2019-01-23 ASSESSMENT — ENCOUNTER SYMPTOMS
RESPIRATORY NEGATIVE: 1
BACK PAIN: 1
ABDOMINAL PAIN: 1

## 2019-01-24 DIAGNOSIS — E78.00 PURE HYPERCHOLESTEROLEMIA: Primary | ICD-10-CM

## 2019-01-24 RX ORDER — ATORVASTATIN CALCIUM 10 MG/1
10 TABLET, FILM COATED ORAL NIGHTLY
Qty: 90 TABLET | Refills: 1 | Status: SHIPPED | OUTPATIENT
Start: 2019-01-24 | End: 2019-08-06

## 2019-01-28 ENCOUNTER — APPOINTMENT (OUTPATIENT)
Dept: GENERAL RADIOLOGY | Age: 59
End: 2019-01-28
Payer: COMMERCIAL

## 2019-01-28 ENCOUNTER — HOSPITAL ENCOUNTER (EMERGENCY)
Age: 59
Discharge: HOME | End: 2019-01-28
Attending: EMERGENCY MEDICINE
Payer: COMMERCIAL

## 2019-01-28 VITALS
SYSTOLIC BLOOD PRESSURE: 110 MMHG | DIASTOLIC BLOOD PRESSURE: 68 MMHG | BODY MASS INDEX: 18.34 KG/M2 | TEMPERATURE: 97.5 F | WEIGHT: 131 LBS | HEIGHT: 71 IN | HEART RATE: 77 BPM | OXYGEN SATURATION: 100 % | RESPIRATION RATE: 16 BRPM

## 2019-01-28 DIAGNOSIS — R55 SYNCOPE AND COLLAPSE: Primary | ICD-10-CM

## 2019-01-28 DIAGNOSIS — K59.00 CONSTIPATION, UNSPECIFIED CONSTIPATION TYPE: ICD-10-CM

## 2019-01-28 LAB
ABSOLUTE BANDS #: 0.03 K/UL (ref 0–1)
ABSOLUTE EOS #: 0.25 K/UL (ref 0–0.4)
ABSOLUTE IMMATURE GRANULOCYTE: ABNORMAL K/UL (ref 0–0.3)
ABSOLUTE LYMPH #: 1.34 K/UL (ref 1–4.8)
ABSOLUTE MONO #: 0.34 K/UL (ref 0.1–1.3)
ALBUMIN SERPL-MCNC: 4.3 G/DL (ref 3.5–5.2)
ALBUMIN/GLOBULIN RATIO: ABNORMAL (ref 1–2.5)
ALP BLD-CCNC: 58 U/L (ref 35–104)
ALT SERPL-CCNC: 16 U/L (ref 5–33)
ANION GAP SERPL CALCULATED.3IONS-SCNC: 14 MMOL/L (ref 9–17)
AST SERPL-CCNC: 17 U/L
BANDS: 1 % (ref 0–10)
BASOPHILS # BLD: 0 % (ref 0–2)
BASOPHILS ABSOLUTE: 0 K/UL (ref 0–0.2)
BILIRUB SERPL-MCNC: 1.14 MG/DL (ref 0.3–1.2)
BUN BLDV-MCNC: 18 MG/DL (ref 6–20)
BUN/CREAT BLD: ABNORMAL (ref 9–20)
CALCIUM SERPL-MCNC: 9.8 MG/DL (ref 8.6–10.4)
CHLORIDE BLD-SCNC: 100 MMOL/L (ref 98–107)
CO2: 26 MMOL/L (ref 20–31)
CREAT SERPL-MCNC: 0.66 MG/DL (ref 0.5–0.9)
DIFFERENTIAL TYPE: ABNORMAL
EKG ATRIAL RATE: 61 BPM
EKG P AXIS: 80 DEGREES
EKG P-R INTERVAL: 168 MS
EKG Q-T INTERVAL: 396 MS
EKG QRS DURATION: 90 MS
EKG QTC CALCULATION (BAZETT): 398 MS
EKG R AXIS: 87 DEGREES
EKG T AXIS: 78 DEGREES
EKG VENTRICULAR RATE: 61 BPM
EOSINOPHILS RELATIVE PERCENT: 9 % (ref 0–4)
GFR AFRICAN AMERICAN: >60 ML/MIN
GFR NON-AFRICAN AMERICAN: >60 ML/MIN
GFR SERPL CREATININE-BSD FRML MDRD: ABNORMAL ML/MIN/{1.73_M2}
GFR SERPL CREATININE-BSD FRML MDRD: ABNORMAL ML/MIN/{1.73_M2}
GLUCOSE BLD-MCNC: 111 MG/DL (ref 70–99)
HCT VFR BLD CALC: 41.9 % (ref 36–46)
HEMOGLOBIN: 13.9 G/DL (ref 12–16)
IMMATURE GRANULOCYTES: ABNORMAL %
LYMPHOCYTES # BLD: 48 % (ref 24–44)
MCH RBC QN AUTO: 30.1 PG (ref 26–34)
MCHC RBC AUTO-ENTMCNC: 33.3 G/DL (ref 31–37)
MCV RBC AUTO: 90.6 FL (ref 80–100)
MONOCYTES # BLD: 12 % (ref 1–7)
MORPHOLOGY: ABNORMAL
NRBC AUTOMATED: ABNORMAL PER 100 WBC
PDW BLD-RTO: 13.2 % (ref 11.5–14.9)
PLATELET # BLD: 146 K/UL (ref 150–450)
PLATELET ESTIMATE: ABNORMAL
PMV BLD AUTO: 10.1 FL (ref 6–12)
POTASSIUM SERPL-SCNC: 3.6 MMOL/L (ref 3.7–5.3)
RBC # BLD: 4.62 M/UL (ref 4–5.2)
RBC # BLD: ABNORMAL 10*6/UL
SEG NEUTROPHILS: 30 % (ref 36–66)
SEGMENTED NEUTROPHILS ABSOLUTE COUNT: 0.84 K/UL (ref 1.3–9.1)
SODIUM BLD-SCNC: 140 MMOL/L (ref 135–144)
TOTAL PROTEIN: 7.2 G/DL (ref 6.4–8.3)
TROPONIN INTERP: NORMAL
TROPONIN INTERP: NORMAL
TROPONIN T: NORMAL NG/ML
TROPONIN T: NORMAL NG/ML
TROPONIN, HIGH SENSITIVITY: 7 NG/L (ref 0–14)
TROPONIN, HIGH SENSITIVITY: <6 NG/L (ref 0–14)
WBC # BLD: 2.8 K/UL (ref 3.5–11)
WBC # BLD: ABNORMAL 10*3/UL

## 2019-01-28 PROCEDURE — 99284 EMERGENCY DEPT VISIT MOD MDM: CPT

## 2019-01-28 PROCEDURE — 2580000003 HC RX 258: Performed by: EMERGENCY MEDICINE

## 2019-01-28 PROCEDURE — 93005 ELECTROCARDIOGRAM TRACING: CPT

## 2019-01-28 PROCEDURE — 84484 ASSAY OF TROPONIN QUANT: CPT

## 2019-01-28 PROCEDURE — 6370000000 HC RX 637 (ALT 250 FOR IP): Performed by: EMERGENCY MEDICINE

## 2019-01-28 PROCEDURE — 85025 COMPLETE CBC W/AUTO DIFF WBC: CPT

## 2019-01-28 PROCEDURE — 74022 RADEX COMPL AQT ABD SERIES: CPT

## 2019-01-28 PROCEDURE — 36415 COLL VENOUS BLD VENIPUNCTURE: CPT

## 2019-01-28 PROCEDURE — 80053 COMPREHEN METABOLIC PANEL: CPT

## 2019-01-28 RX ORDER — DOCUSATE SODIUM 100 MG/1
100 CAPSULE, LIQUID FILLED ORAL 2 TIMES DAILY
Qty: 30 CAPSULE | Refills: 0 | Status: SHIPPED | OUTPATIENT
Start: 2019-01-28 | End: 2019-09-24

## 2019-01-28 RX ORDER — ACETAMINOPHEN 325 MG/1
650 TABLET ORAL ONCE
Status: COMPLETED | OUTPATIENT
Start: 2019-01-28 | End: 2019-01-28

## 2019-01-28 RX ORDER — 0.9 % SODIUM CHLORIDE 0.9 %
1000 INTRAVENOUS SOLUTION INTRAVENOUS ONCE
Status: COMPLETED | OUTPATIENT
Start: 2019-01-28 | End: 2019-01-28

## 2019-01-28 RX ADMIN — ACETAMINOPHEN 650 MG: 325 TABLET, FILM COATED ORAL at 14:06

## 2019-01-28 RX ADMIN — SODIUM CHLORIDE 1000 ML: 9 INJECTION, SOLUTION INTRAVENOUS at 10:31

## 2019-01-28 ASSESSMENT — PAIN SCALES - GENERAL
PAINLEVEL_OUTOF10: 6
PAINLEVEL_OUTOF10: 5

## 2019-01-28 ASSESSMENT — ENCOUNTER SYMPTOMS
CONSTIPATION: 1
DIARRHEA: 0
NAUSEA: 0
SHORTNESS OF BREATH: 0
VOMITING: 0
ABDOMINAL PAIN: 0

## 2019-03-14 ENCOUNTER — HOSPITAL ENCOUNTER (EMERGENCY)
Age: 59
Discharge: HOME | End: 2019-03-14
Attending: EMERGENCY MEDICINE
Payer: COMMERCIAL

## 2019-03-14 VITALS
OXYGEN SATURATION: 100 % | SYSTOLIC BLOOD PRESSURE: 120 MMHG | HEIGHT: 70 IN | HEART RATE: 78 BPM | TEMPERATURE: 97.9 F | WEIGHT: 130 LBS | RESPIRATION RATE: 16 BRPM | DIASTOLIC BLOOD PRESSURE: 84 MMHG | BODY MASS INDEX: 18.61 KG/M2

## 2019-03-14 DIAGNOSIS — N30.00 ACUTE CYSTITIS WITHOUT HEMATURIA: Primary | ICD-10-CM

## 2019-03-14 LAB
-: ABNORMAL
AMORPHOUS: ABNORMAL
BACTERIA: ABNORMAL
BILIRUBIN URINE: NEGATIVE
CASTS UA: ABNORMAL /LPF (ref 0–8)
COLOR: YELLOW
COMMENT UA: ABNORMAL
CRYSTALS, UA: ABNORMAL /HPF
EPITHELIAL CELLS UA: ABNORMAL /HPF (ref 0–5)
GLUCOSE URINE: NEGATIVE
KETONES, URINE: NEGATIVE
LEUKOCYTE ESTERASE, URINE: ABNORMAL
MUCUS: ABNORMAL
NITRITE, URINE: NEGATIVE
OTHER OBSERVATIONS UA: ABNORMAL
PH UA: 6.5 (ref 5–8)
PROTEIN UA: NEGATIVE
RBC UA: ABNORMAL /HPF (ref 0–4)
RENAL EPITHELIAL, UA: ABNORMAL /HPF
SPECIFIC GRAVITY UA: 1.01 (ref 1–1.03)
TRICHOMONAS: ABNORMAL
TURBIDITY: CLEAR
URINE HGB: ABNORMAL
UROBILINOGEN, URINE: NORMAL
WBC UA: ABNORMAL /HPF (ref 0–5)
YEAST: ABNORMAL

## 2019-03-14 PROCEDURE — 81001 URINALYSIS AUTO W/SCOPE: CPT

## 2019-03-14 PROCEDURE — 87086 URINE CULTURE/COLONY COUNT: CPT

## 2019-03-14 PROCEDURE — 87186 SC STD MICRODIL/AGAR DIL: CPT

## 2019-03-14 PROCEDURE — 6370000000 HC RX 637 (ALT 250 FOR IP): Performed by: EMERGENCY MEDICINE

## 2019-03-14 PROCEDURE — 87088 URINE BACTERIA CULTURE: CPT

## 2019-03-14 PROCEDURE — 99284 EMERGENCY DEPT VISIT MOD MDM: CPT

## 2019-03-14 RX ORDER — CEPHALEXIN 500 MG/1
500 CAPSULE ORAL 4 TIMES DAILY
Qty: 28 CAPSULE | Refills: 0 | Status: SHIPPED | OUTPATIENT
Start: 2019-03-14 | End: 2019-03-21

## 2019-03-14 RX ORDER — CEPHALEXIN 250 MG/1
500 CAPSULE ORAL ONCE
Status: COMPLETED | OUTPATIENT
Start: 2019-03-14 | End: 2019-03-14

## 2019-03-14 RX ORDER — ACETAMINOPHEN 325 MG/1
650 TABLET ORAL ONCE
Status: COMPLETED | OUTPATIENT
Start: 2019-03-14 | End: 2019-03-14

## 2019-03-14 RX ADMIN — ACETAMINOPHEN 650 MG: 325 TABLET ORAL at 15:49

## 2019-03-14 RX ADMIN — CEPHALEXIN 500 MG: 250 CAPSULE ORAL at 16:14

## 2019-03-14 ASSESSMENT — ENCOUNTER SYMPTOMS
BACK PAIN: 0
VOMITING: 0
VOICE CHANGE: 0
CHEST TIGHTNESS: 0
COUGH: 0
SHORTNESS OF BREATH: 0
NAUSEA: 0
ABDOMINAL PAIN: 0
FACIAL SWELLING: 0
TROUBLE SWALLOWING: 0
SINUS PAIN: 0
DIARRHEA: 0

## 2019-03-14 ASSESSMENT — PAIN DESCRIPTION - PAIN TYPE: TYPE: ACUTE PAIN

## 2019-03-14 ASSESSMENT — PAIN DESCRIPTION - LOCATION: LOCATION: FLANK

## 2019-03-14 ASSESSMENT — PAIN DESCRIPTION - ORIENTATION: ORIENTATION: LEFT

## 2019-03-14 ASSESSMENT — PAIN SCALES - GENERAL
PAINLEVEL_OUTOF10: 7
PAINLEVEL_OUTOF10: 7

## 2019-03-14 ASSESSMENT — PAIN DESCRIPTION - DESCRIPTORS: DESCRIPTORS: NUMBNESS

## 2019-03-15 LAB
CULTURE: ABNORMAL
Lab: ABNORMAL
SPECIMEN DESCRIPTION: ABNORMAL

## 2019-03-25 ENCOUNTER — TELEPHONE (OUTPATIENT)
Dept: FAMILY MEDICINE CLINIC | Age: 59
End: 2019-03-25

## 2019-03-25 DIAGNOSIS — M54.5 CHRONIC LOW BACK PAIN, UNSPECIFIED BACK PAIN LATERALITY, WITH SCIATICA PRESENCE UNSPECIFIED: Primary | ICD-10-CM

## 2019-03-25 DIAGNOSIS — G89.29 CHRONIC LOW BACK PAIN, UNSPECIFIED BACK PAIN LATERALITY, WITH SCIATICA PRESENCE UNSPECIFIED: Primary | ICD-10-CM

## 2019-04-01 ENCOUNTER — TELEPHONE (OUTPATIENT)
Dept: FAMILY MEDICINE CLINIC | Age: 59
End: 2019-04-01

## 2019-04-02 NOTE — TELEPHONE ENCOUNTER
Last visit: 1/23/19    Next Visit Date:  Future Appointments   Date Time Provider Derrell Gloria   4/23/2019  2:30 PM ELISHA Bauer - CNP [facility]   5/28/2019  3:00 PM Carol Canseco MD  derm [address] Maintenance   Topic Date Due    Shingles Vaccine (1 of 2) 06/20/2019 (Originally 1/1/2012)    DTaP/Tdap/Td vaccine (1 - Tdap) 01/23/2020 (Originally 1/1/1981)    Flu vaccine (Season Ended) 01/23/2020 (Originally 9/1/2019)    Breast cancer screen  05/07/2020    Colon cancer screen colonoscopy  08/30/2023    Lipid screen  01/23/2024    HIV screen  Completed    Hepatitis C screen  Discontinued       Hemoglobin A1C (%)   Date Value   04/30/2013 5.5             ( goal A1C is < 7)   No results found for: LABMICR  LDL Cholesterol (mg/dL)   Date Value   01/23/2019 129   07/09/2015 115       (goal LDL is <100)   AST (U/L)   Date Value   01/28/2019 17     ALT (U/L)   Date Value   01/28/2019 16     BUN (mg/dL)   Date Value   01/28/2019 18     BP Readings from Last 3 Encounters:   03/14/19 120/84   01/28/19 110/68   01/23/19 100/60          (goal 120/80)    All Future Testing planned in [facility]  Lab Frequency Next Occurrence               Patient Active Problem List:     Anemia     Encounter for blood transfusion     Hyperlipidemia     Back pain     Hip pain     Degeneration of lumbar or lumbosacral intervertebral disc     Lumbar spondylosis     Lumbar facet arthropathy     Chronic low back pain     Left hip pain     Spondylosis of lumbar region without myelopathy or radiculopathy     Chronic bilateral low back pain without sciatica     Encounter for pain management planning     Ganglion of hip     Syncope     Notalgia paresthetica     Arthritis pain of hand     Pure hypercholesterolemia

## 2019-04-03 RX ORDER — CYCLOBENZAPRINE HCL 10 MG
TABLET ORAL
Qty: 90 TABLET | Refills: 1 | Status: SHIPPED | OUTPATIENT
Start: 2019-04-03 | End: 2019-08-06 | Stop reason: ALTCHOICE

## 2019-04-03 RX ORDER — FLUTICASONE PROPIONATE 50 MCG
SPRAY, SUSPENSION (ML) NASAL
Qty: 16 G | Refills: 5 | Status: SHIPPED | OUTPATIENT
Start: 2019-04-03 | End: 2019-09-24 | Stop reason: SDUPTHER

## 2019-04-03 NOTE — TELEPHONE ENCOUNTER
Patient states she has been having constipation. Patinet states she has a BM every 2-3 days. States that she is very bloated.

## 2019-05-22 ENCOUNTER — HOSPITAL ENCOUNTER (OUTPATIENT)
Dept: WOMENS IMAGING | Age: 59
Discharge: HOME | End: 2019-05-24
Payer: COMMERCIAL

## 2019-05-22 DIAGNOSIS — Z12.39 SCREENING BREAST EXAMINATION: ICD-10-CM

## 2019-05-22 PROCEDURE — 77063 BREAST TOMOSYNTHESIS BI: CPT

## 2019-05-28 ENCOUNTER — OFFICE VISIT (OUTPATIENT)
Dept: DERMATOLOGY | Age: 59
End: 2019-05-28
Payer: COMMERCIAL

## 2019-05-28 VITALS
BODY MASS INDEX: 17.92 KG/M2 | HEIGHT: 71 IN | DIASTOLIC BLOOD PRESSURE: 74 MMHG | SYSTOLIC BLOOD PRESSURE: 125 MMHG | OXYGEN SATURATION: 100 % | HEART RATE: 80 BPM | WEIGHT: 128 LBS

## 2019-05-28 DIAGNOSIS — R20.2 NOTALGIA PARESTHETICA: Primary | ICD-10-CM

## 2019-05-28 PROCEDURE — G8427 DOCREV CUR MEDS BY ELIG CLIN: HCPCS | Performed by: DERMATOLOGY

## 2019-05-28 PROCEDURE — 3017F COLORECTAL CA SCREEN DOC REV: CPT | Performed by: DERMATOLOGY

## 2019-05-28 PROCEDURE — 99202 OFFICE O/P NEW SF 15 MIN: CPT | Performed by: DERMATOLOGY

## 2019-05-28 PROCEDURE — G8418 CALC BMI BLW LOW PARAM F/U: HCPCS | Performed by: DERMATOLOGY

## 2019-05-28 PROCEDURE — 1036F TOBACCO NON-USER: CPT | Performed by: DERMATOLOGY

## 2019-05-28 RX ORDER — CAPSAICIN 0.025 %
CREAM (GRAM) TOPICAL
Qty: 1 TUBE | Refills: 5 | Status: SHIPPED | OUTPATIENT
Start: 2019-05-28 | End: 2019-08-06 | Stop reason: ALTCHOICE

## 2019-05-28 NOTE — PATIENT INSTRUCTIONS
1. Notalgia Paresthetica (irritated nerve roots) Apply capsaicin to areas of discomfort 4 x daily  2. Discuss trying gabapentin or similar with PCP (also discuss GI issues with PCP or referral to GI)  3.  Follow up in the office as needed

## 2019-05-29 NOTE — PROGRESS NOTES
Dermatology Patient Note  [facility] DERMATOLOGY  [address]  [address] [place] [address]  Dept: [phone]  Dept Fax: [phone]      VISIT DATE: 5/28/2019   REFERRING PROVIDER: ELISHA Harden *      Ravindra Jenkins is a 64 y.o. female  who presents today in the office for:    New Patient (Notalgia paresthetica)      HISTORY OF PRESENT ILLNESS:  HPI Rash:    Lena Cedeño was seen today for initial evaluation of mid back itching    Duration of Rash: months    Course: Persistent    Areas of Involvement: mid back    Associated Symptoms: Itching    Exacerbating Factors: has lumbar lordosis and DDD     Current Medications for this Rash:  None     Previously Tried Medications: None    Problem Specific Family Hx: No Contributory Family History      CURRENT MEDICATIONS:   Current Outpatient Medications   Medication Sig Dispense Refill    ibuprofen (ADVIL;MOTRIN) 800 MG tablet take 1 tablet by mouth four times a day if needed for pain 60 tablet 2    capsaicin (ZOSTRIX) 0.025 % cream apply topically twice a day 1 Tube 5    lidocaine (LMX) 4 % cream apply affected area twice a day if needed 15 g 5    fluticasone (FLONASE) 50 MCG/ACT nasal spray instill 2 sprays into each nostril once daily 16 g 5    cyclobenzaprine (FLEXERIL) 10 MG tablet take 1 tablet by mouth three times a day if needed 90 tablet 1    Elastic Bandages & Supports (LUMBAR BACK BRACE/SUPPORT PAD) MISC 1 each by Does not apply route daily as needed (back pain) 1 each 0    docusate sodium (COLACE) 100 MG capsule Take 1 capsule by mouth 2 times daily 30 capsule 0    atorvastatin (LIPITOR) 10 MG tablet Take 1 tablet by mouth nightly 90 tablet 1    Lactobacillus (ACIDOPHILUS PROBIOTIC) TABS take 1 tablet by mouth once daily 30 tablet 11    loratadine (CLARITIN) 10 MG tablet take 1 tablet by mouth once daily 30 tablet 5    erythromycin (ROMYCIN) 5 MG/GM ophthalmic ointment APPLY A 1 CM RIBBON INTO THE RIGHT UPPER EYELID THREE TIMES PER DAY FOR 5 DAYS 3.5 g 0    Misc Natural Products (OSTEO BI-FLEX ADV DOUBLE ST) TABS Take by mouth      Capsaicin-Menthol 0.0375-5 % PTCH Apply 1 Dose topically daily as needed 15 patch 3    Tens Unit MISC by Does not apply route. For back pain 1 each 0    acetaminophen (TYLENOL) 500 MG tablet Take 500 mg by mouth every 6 hours as needed. No current facility-administered medications for this visit. ALLERGIES:   No Known Allergies    SOCIAL HISTORY:  Social History     Tobacco Use    Smoking status: Never Smoker    Smokeless tobacco: Never Used   Substance Use Topics    Alcohol use: Yes     Comment: occasional alcohol comsumption       REVIEW OF SYSTEMS:  Review of Systems   Constitutional: Negative. Skin:Denies any new changing, growing or bleeding lesions or rashes except as described in the HPI     PHYSICAL EXAM:   /74   Pulse 80   Ht 5' 10.5\" (1.791 m)   Wt 128 lb (58.1 kg)   LMP 03/06/2011   SpO2 100%   BMI 18.11 kg/m²     General Exam:  General Appearance: No acute distress, Well nourished     Neuro: Alert and oriented to person, place and time  Psych: Normal affect   Lymph Node: Not performed    Cutaneous Exam: Performed as documented in clinic note below. Waist-up skin, whichincludes the head/face, neck, both arms, chest, back, abdomen, digits and/or nails, was examined. Pertinent Physical Exam Findings:  Physical Exam   Skin:            Medical Necessity of Exam Performed:   Distribution of patient concerns    Additional Diagnostic Testing performed during exam: Not performed ,  Not performed    ASSESSMENT:   Diagnosis Orders   1. Notalgia paresthetica         Plan of Action is as Follows:  Assessment 1. Notalgia paresthetica  - Discussed that her midline itching without rash is c/w neuronal itching as the area overlies her underlying DDD/lumbar lordosis likely secondary to nerve impingement in this area.   - Discussed first line would be capsaicin four times

## 2019-08-01 ENCOUNTER — TELEPHONE (OUTPATIENT)
Dept: FAMILY MEDICINE CLINIC | Age: 59
End: 2019-08-01

## 2019-08-01 NOTE — TELEPHONE ENCOUNTER
Patient called stating she has IBS and is wanting to know if she has parasites or something that could cause it. Patient would like to know if there is anything she can try. Patient states she doesn't want to be on medications for this.

## 2019-08-06 ENCOUNTER — OFFICE VISIT (OUTPATIENT)
Dept: FAMILY MEDICINE CLINIC | Age: 59
End: 2019-08-06
Payer: COMMERCIAL

## 2019-08-06 VITALS
HEART RATE: 80 BPM | SYSTOLIC BLOOD PRESSURE: 128 MMHG | DIASTOLIC BLOOD PRESSURE: 80 MMHG | BODY MASS INDEX: 17.37 KG/M2 | WEIGHT: 122.8 LBS | OXYGEN SATURATION: 100 % | TEMPERATURE: 97.4 F

## 2019-08-06 DIAGNOSIS — K29.50 CHRONIC GASTRITIS WITHOUT BLEEDING, UNSPECIFIED GASTRITIS TYPE: Primary | ICD-10-CM

## 2019-08-06 DIAGNOSIS — K58.1 IRRITABLE BOWEL SYNDROME WITH CONSTIPATION: ICD-10-CM

## 2019-08-06 PROCEDURE — G8427 DOCREV CUR MEDS BY ELIG CLIN: HCPCS | Performed by: INTERNAL MEDICINE

## 2019-08-06 PROCEDURE — 99213 OFFICE O/P EST LOW 20 MIN: CPT | Performed by: INTERNAL MEDICINE

## 2019-08-06 PROCEDURE — G8419 CALC BMI OUT NRM PARAM NOF/U: HCPCS | Performed by: INTERNAL MEDICINE

## 2019-08-06 PROCEDURE — 3017F COLORECTAL CA SCREEN DOC REV: CPT | Performed by: INTERNAL MEDICINE

## 2019-08-06 PROCEDURE — 1036F TOBACCO NON-USER: CPT | Performed by: INTERNAL MEDICINE

## 2019-08-06 RX ORDER — RANITIDINE 150 MG/1
150 TABLET ORAL 2 TIMES DAILY PRN
Qty: 60 TABLET | Refills: 5 | Status: SHIPPED | OUTPATIENT
Start: 2019-08-06 | End: 2019-09-24

## 2019-08-06 ASSESSMENT — ENCOUNTER SYMPTOMS
COUGH: 0
VOMITING: 0
CHANGE IN BOWEL HABIT: 0
ABDOMINAL PAIN: 1
SWOLLEN GLANDS: 0
VISUAL CHANGE: 0
NAUSEA: 0
SORE THROAT: 0

## 2019-09-04 ENCOUNTER — TELEPHONE (OUTPATIENT)
Dept: FAMILY MEDICINE CLINIC | Age: 59
End: 2019-09-04

## 2019-09-24 ENCOUNTER — OFFICE VISIT (OUTPATIENT)
Dept: FAMILY MEDICINE CLINIC | Age: 59
End: 2019-09-24
Payer: COMMERCIAL

## 2019-09-24 VITALS
HEART RATE: 76 BPM | SYSTOLIC BLOOD PRESSURE: 104 MMHG | BODY MASS INDEX: 16.32 KG/M2 | TEMPERATURE: 97 F | HEIGHT: 71 IN | OXYGEN SATURATION: 100 % | DIASTOLIC BLOOD PRESSURE: 60 MMHG | WEIGHT: 116.6 LBS

## 2019-09-24 DIAGNOSIS — K59.00 CONSTIPATION, UNSPECIFIED CONSTIPATION TYPE: ICD-10-CM

## 2019-09-24 DIAGNOSIS — R68.89 UNINTENTIONAL WEIGHT CHANGE: ICD-10-CM

## 2019-09-24 DIAGNOSIS — Z00.00 ROUTINE GENERAL MEDICAL EXAMINATION AT A HEALTH CARE FACILITY: Primary | ICD-10-CM

## 2019-09-24 PROCEDURE — 3017F COLORECTAL CA SCREEN DOC REV: CPT | Performed by: NURSE PRACTITIONER

## 2019-09-24 PROCEDURE — G8419 CALC BMI OUT NRM PARAM NOF/U: HCPCS | Performed by: NURSE PRACTITIONER

## 2019-09-24 PROCEDURE — G8427 DOCREV CUR MEDS BY ELIG CLIN: HCPCS | Performed by: NURSE PRACTITIONER

## 2019-09-24 PROCEDURE — 1036F TOBACCO NON-USER: CPT | Performed by: NURSE PRACTITIONER

## 2019-09-24 PROCEDURE — 99213 OFFICE O/P EST LOW 20 MIN: CPT | Performed by: NURSE PRACTITIONER

## 2019-09-24 PROCEDURE — G0438 PPPS, INITIAL VISIT: HCPCS | Performed by: NURSE PRACTITIONER

## 2019-09-24 RX ORDER — FLUTICASONE PROPIONATE 50 MCG
SPRAY, SUSPENSION (ML) NASAL
Qty: 16 G | Refills: 5 | Status: SHIPPED | OUTPATIENT
Start: 2019-09-24 | End: 2020-08-27 | Stop reason: SDUPTHER

## 2019-09-24 ASSESSMENT — LIFESTYLE VARIABLES: HOW OFTEN DO YOU HAVE A DRINK CONTAINING ALCOHOL: 0

## 2019-09-24 ASSESSMENT — ENCOUNTER SYMPTOMS
COUGH: 0
SHORTNESS OF BREATH: 0
CONSTIPATION: 1

## 2019-09-24 ASSESSMENT — PATIENT HEALTH QUESTIONNAIRE - PHQ9
SUM OF ALL RESPONSES TO PHQ QUESTIONS 1-9: 0
SUM OF ALL RESPONSES TO PHQ QUESTIONS 1-9: 0

## 2019-09-24 NOTE — PROGRESS NOTES
past 7 days, have you experienced any of the following? New or Increased Pain, New or Increased Fatigue, Loneliness, Social Isolation, Stress or Anger?: (!) Stress, Anger  Do you get the social and emotional support that you need?: Yes  Do you have a Living Will?: (!) No  General Health Risk Interventions:  · No Living Will: patient declined   · Pt reports stress isn't new, its ongoing. Health Habits/Nutrition:  Health Habits/Nutrition  Do you exercise for at least 20 minutes 2-3 times per week?: Yes  Have you lost any weight without trying in the past 3 months?: (!) Yes  Do you eat fewer than 2 meals per day?: No  Have you seen a dentist within the past year?: Yes  Body mass index is 16.49 kg/m². Health Habits/Nutrition Interventions:  · referral to gi given    Hearing/Vision:  No exam data present  Hearing/Vision  Do you or your family notice any trouble with your hearing?: No  Do you have difficulty driving, watching TV, or doing any of your daily activities because of your eyesight?: No  Have you had an eye exam within the past year?: (!) No  Hearing/Vision Interventions:  · Vision concerns:  patient encouraged to make appointment with his/her eye specialist    ADL:  ADLs  In the past 7 days, did you need help from others to perform any of the following everyday activities? Eating, dressing, grooming, bathing, toileting, or walking/balance?: None  In the past 7 days, did you need help from others to take care of any of the following?  Laundry, housekeeping, banking/finances, shopping, telephone use, food preparation, transportation, or taking medications?: [employer], Housekeeping, Shopping  ADL Interventions:  · Patient declines any further evaluation/treatment for this issue    Personalized Preventive Plan   Current Health Maintenance Status  Immunization History   Administered Date(s) Administered    PPD Test 06/24/2013        Health Maintenance   Topic Date Due   [employer] Visit (AWV) current medications, diet and exercise.     CompletedRefills   Requested Prescriptions     Signed Prescriptions Disp Refills    fluticasone (FLONASE) 50 MCG/ACT nasal spray 16 g 5     Sig: instill 2 sprays into each nostril once daily         Electronically signed by Rachele Szymanski CNP on 9/24/2019 at 8:43 PM

## 2019-09-24 NOTE — PROGRESS NOTES
Patient is present for annual wellness exam    Pharmacy and medication reviewed with patient    Visit Information    Have you changed or started any medications since your last visit including any over-the-counter medicines, vitamins, or herbal medicines? no   Have you stopped taking any of your medications? Is so, why? -  no  Are you having any side effects from any of your medications? - no    Have you seen any other physician or provider since your last visit? yes - urgent care   Have you had any other diagnostic tests since your last visit? yes - xray   Have you been seen in the emergency room and/or had an admission in a hospital since we last saw you?  no   Have you had your routine dental cleaning in the past 6 months?  yes -      Do you have an active MyChart account? If no, what is the barrier?   Yes    Patient Care Team:  ELISHA Dubon CNP as PCP - General (Certified Nurse Practitioner)  ELISHA Dubon CNP as PCP - [facility]  Gulshan Juan MD as Consulting Physician (Gastroenterology)  Nicholas Wharton MD as Surgeon (Orthopedic Surgery)  MD Rosario Hall APRN - CNP as Referring Physician (Certified Nurse Practitioner)    Medical History Review  Past Medical, Family, and Social History reviewed and does contribute to the patient presenting condition    Health Maintenance   Topic Date Due    Annual Wellness Visit (AWV)  05/29/2019    DTaP/Tdap/Td vaccine (1 - Tdap) 01/23/2020 (Originally 1/1/1981)    Flu vaccine (1) 01/23/2020 (Originally 9/1/2019)    Shingles Vaccine (1 of 2) 08/06/2020 (Originally 1/1/2012)    Breast cancer screen  05/22/2021    Colon cancer screen colonoscopy  08/30/2023    Lipid screen  01/23/2024    HIV screen  Completed    Pneumococcal 0-64 years Vaccine  Aged Out    Hepatitis C screen  Discontinued

## 2019-10-08 LAB
ALBUMIN SERPL-MCNC: NORMAL G/DL
ALP BLD-CCNC: NORMAL U/L
ALT SERPL-CCNC: NORMAL U/L
ANION GAP SERPL CALCULATED.3IONS-SCNC: NORMAL MMOL/L
AST SERPL-CCNC: NORMAL U/L
BASOPHILS ABSOLUTE: NORMAL /ΜL
BASOPHILS RELATIVE PERCENT: NORMAL %
BILIRUB SERPL-MCNC: NORMAL MG/DL (ref 0.1–1.4)
BUN BLDV-MCNC: NORMAL MG/DL
CALCIUM SERPL-MCNC: NORMAL MG/DL
CHLORIDE BLD-SCNC: NORMAL MMOL/L
CO2: NORMAL MMOL/L
CREAT SERPL-MCNC: NORMAL MG/DL
EOSINOPHILS ABSOLUTE: NORMAL /ΜL
EOSINOPHILS RELATIVE PERCENT: NORMAL %
GFR CALCULATED: NORMAL
GLUCOSE BLD-MCNC: NORMAL MG/DL
HCT VFR BLD CALC: NORMAL % (ref 36–46)
HEMOGLOBIN: NORMAL G/DL (ref 12–16)
LYMPHOCYTES ABSOLUTE: NORMAL /ΜL
LYMPHOCYTES RELATIVE PERCENT: NORMAL %
MCH RBC QN AUTO: NORMAL PG
MCHC RBC AUTO-ENTMCNC: NORMAL G/DL
MCV RBC AUTO: NORMAL FL
MONOCYTES ABSOLUTE: NORMAL /ΜL
MONOCYTES RELATIVE PERCENT: NORMAL %
NEUTROPHILS ABSOLUTE: NORMAL /ΜL
NEUTROPHILS RELATIVE PERCENT: NORMAL %
PDW BLD-RTO: NORMAL %
PLATELET # BLD: NORMAL K/ΜL
PMV BLD AUTO: NORMAL FL
POTASSIUM SERPL-SCNC: NORMAL MMOL/L
RBC # BLD: NORMAL 10^6/ΜL
SODIUM BLD-SCNC: NORMAL MMOL/L
TOTAL PROTEIN: NORMAL
WBC # BLD: NORMAL 10^3/ML

## 2019-10-10 ENCOUNTER — OFFICE VISIT (OUTPATIENT)
Dept: GASTROENTEROLOGY | Age: 59
End: 2019-10-10
Payer: COMMERCIAL

## 2019-10-10 ENCOUNTER — TELEPHONE (OUTPATIENT)
Dept: GASTROENTEROLOGY | Age: 59
End: 2019-10-10

## 2019-10-10 VITALS
BODY MASS INDEX: 16.69 KG/M2 | DIASTOLIC BLOOD PRESSURE: 74 MMHG | WEIGHT: 118 LBS | HEART RATE: 76 BPM | SYSTOLIC BLOOD PRESSURE: 112 MMHG

## 2019-10-10 DIAGNOSIS — K29.50 CHRONIC GASTRITIS WITHOUT BLEEDING, UNSPECIFIED GASTRITIS TYPE: ICD-10-CM

## 2019-10-10 DIAGNOSIS — R63.4 WEIGHT LOSS: Primary | ICD-10-CM

## 2019-10-10 DIAGNOSIS — Z00.00 ROUTINE GENERAL MEDICAL EXAMINATION AT A HEALTH CARE FACILITY: ICD-10-CM

## 2019-10-10 DIAGNOSIS — K59.09 OTHER CONSTIPATION: ICD-10-CM

## 2019-10-10 DIAGNOSIS — K58.1 IRRITABLE BOWEL SYNDROME WITH CONSTIPATION: ICD-10-CM

## 2019-10-10 DIAGNOSIS — K21.9 GASTROESOPHAGEAL REFLUX DISEASE WITHOUT ESOPHAGITIS: ICD-10-CM

## 2019-10-10 PROCEDURE — G8427 DOCREV CUR MEDS BY ELIG CLIN: HCPCS | Performed by: INTERNAL MEDICINE

## 2019-10-10 PROCEDURE — G8484 FLU IMMUNIZE NO ADMIN: HCPCS | Performed by: INTERNAL MEDICINE

## 2019-10-10 PROCEDURE — G8418 CALC BMI BLW LOW PARAM F/U: HCPCS | Performed by: INTERNAL MEDICINE

## 2019-10-10 PROCEDURE — 99204 OFFICE O/P NEW MOD 45 MIN: CPT | Performed by: INTERNAL MEDICINE

## 2019-10-10 ASSESSMENT — ENCOUNTER SYMPTOMS
RECTAL PAIN: 0
COUGH: 0
BLOOD IN STOOL: 0
NAUSEA: 0
WHEEZING: 0
CHOKING: 0
DIARRHEA: 0
TROUBLE SWALLOWING: 0
ABDOMINAL PAIN: 1
ABDOMINAL DISTENTION: 0
CONSTIPATION: 1
VOMITING: 0
ANAL BLEEDING: 0

## 2019-11-14 ENCOUNTER — TELEPHONE (OUTPATIENT)
Dept: FAMILY MEDICINE CLINIC | Age: 59
End: 2019-11-14

## 2019-11-21 ENCOUNTER — TELEPHONE (OUTPATIENT)
Dept: FAMILY MEDICINE CLINIC | Age: 59
End: 2019-11-21

## 2019-12-02 DIAGNOSIS — D72.819 LEUKOPENIA, UNSPECIFIED TYPE: Primary | ICD-10-CM

## 2020-01-10 ENCOUNTER — TELEPHONE (OUTPATIENT)
Dept: FAMILY MEDICINE CLINIC | Age: 60
End: 2020-01-10

## 2020-02-18 ENCOUNTER — HOSPITAL ENCOUNTER (OUTPATIENT)
Age: 60
Setting detail: SPECIMEN
Discharge: HOME | End: 2020-02-18
Payer: COMMERCIAL

## 2020-02-18 LAB
HCT VFR BLD CALC: 41.8 % (ref 36.3–47.1)
HEMOGLOBIN: 13.4 G/DL (ref 11.9–15.1)
MCH RBC QN AUTO: 30.2 PG (ref 25.2–33.5)
MCHC RBC AUTO-ENTMCNC: 32.1 G/DL (ref 28.4–34.8)
MCV RBC AUTO: 94.1 FL (ref 82.6–102.9)
NRBC AUTOMATED: 0 PER 100 WBC
PDW BLD-RTO: 12.6 % (ref 11.8–14.4)
PLATELET # BLD: 160 K/UL (ref 138–453)
PMV BLD AUTO: 12.2 FL (ref 8.1–13.5)
RBC # BLD: 4.44 M/UL (ref 3.95–5.11)
WBC # BLD: 3 K/UL (ref 3.5–11.3)

## 2020-02-18 RX ORDER — CAPSAICIN 0.025 %
CREAM (GRAM) TOPICAL
Qty: 60 G | OUTPATIENT
Start: 2020-02-18

## 2020-02-20 ENCOUNTER — TELEPHONE (OUTPATIENT)
Dept: DERMATOLOGY | Age: 60
End: 2020-02-20

## 2020-02-20 RX ORDER — CAPSAICIN 0.025 %
CREAM (GRAM) TOPICAL
Qty: 45 G | Refills: 3 | Status: SHIPPED | OUTPATIENT
Start: 2020-02-20 | End: 2020-03-21

## 2020-02-20 NOTE — TELEPHONE ENCOUNTER
Patient was seen last May for Notalgia Paresthetica, she would like a refill on cream do you need to see her or will you send Rx for cream over.

## 2020-02-20 NOTE — TELEPHONE ENCOUNTER
I sent in with 3 refills - I would need to see her before more than this can be given, but if she is stable would recommend seeing if PCP would fill for her so she doesn't need to see 2 physicians,    Gladys Novak

## 2020-03-03 ENCOUNTER — TELEPHONE (OUTPATIENT)
Dept: FAMILY MEDICINE CLINIC | Age: 60
End: 2020-03-03

## 2020-03-03 NOTE — TELEPHONE ENCOUNTER
----- Message from ELISHA Hudson CNP sent at 3/2/2020 10:03 AM EST -----  Referral to hematology for leukopenia

## 2020-03-13 ENCOUNTER — HOSPITAL ENCOUNTER (OUTPATIENT)
Facility: MEDICAL CENTER | Age: 60
Discharge: HOME | End: 2020-03-13
Payer: COMMERCIAL

## 2020-03-13 ENCOUNTER — TELEPHONE (OUTPATIENT)
Dept: ONCOLOGY | Age: 60
End: 2020-03-13

## 2020-03-13 ENCOUNTER — INITIAL CONSULT (OUTPATIENT)
Dept: ONCOLOGY | Age: 60
End: 2020-03-13
Payer: COMMERCIAL

## 2020-03-13 VITALS
HEART RATE: 70 BPM | DIASTOLIC BLOOD PRESSURE: 70 MMHG | SYSTOLIC BLOOD PRESSURE: 115 MMHG | HEIGHT: 70 IN | BODY MASS INDEX: 17.21 KG/M2 | RESPIRATION RATE: 16 BRPM | WEIGHT: 120.2 LBS | TEMPERATURE: 98 F

## 2020-03-13 DIAGNOSIS — R53.83 FATIGUE, UNSPECIFIED TYPE: ICD-10-CM

## 2020-03-13 DIAGNOSIS — D70.9 NEUTROPENIA, UNSPECIFIED TYPE (HCC): ICD-10-CM

## 2020-03-13 DIAGNOSIS — R79.9 ABNORMAL FINDING OF BLOOD CHEMISTRY, UNSPECIFIED: ICD-10-CM

## 2020-03-13 LAB
ABSOLUTE EOS #: 0.09 K/UL (ref 0–0.44)
ABSOLUTE IMMATURE GRANULOCYTE: 0.01 K/UL (ref 0–0.3)
ABSOLUTE LYMPH #: 1.06 K/UL (ref 1.1–3.7)
ABSOLUTE MONO #: 0.21 K/UL (ref 0.1–1.2)
ABSOLUTE RETIC #: 0.03 M/UL (ref 0.03–0.08)
ALBUMIN SERPL-MCNC: 5.3 G/DL (ref 3.5–5.2)
ALBUMIN/GLOBULIN RATIO: ABNORMAL (ref 1–2.5)
ALP BLD-CCNC: 78 U/L (ref 35–104)
ALT SERPL-CCNC: 30 U/L (ref 5–33)
ANION GAP SERPL CALCULATED.3IONS-SCNC: 14 MMOL/L (ref 9–17)
AST SERPL-CCNC: 31 U/L
BASOPHILS # BLD: 1 % (ref 0–2)
BASOPHILS ABSOLUTE: 0.03 K/UL (ref 0–0.2)
BILIRUB SERPL-MCNC: 1.21 MG/DL (ref 0.3–1.2)
BUN BLDV-MCNC: 13 MG/DL (ref 6–20)
BUN/CREAT BLD: 14 (ref 9–20)
CALCIUM SERPL-MCNC: 10.2 MG/DL (ref 8.6–10.4)
CHLORIDE BLD-SCNC: 99 MMOL/L (ref 98–107)
CO2: 27 MMOL/L (ref 20–31)
CREAT SERPL-MCNC: 0.91 MG/DL (ref 0.5–0.9)
DIFFERENTIAL TYPE: ABNORMAL
EOSINOPHILS RELATIVE PERCENT: 4 % (ref 1–4)
FERRITIN: 323 UG/L (ref 13–150)
FOLATE: 10.7 NG/ML
GFR AFRICAN AMERICAN: >60 ML/MIN
GFR NON-AFRICAN AMERICAN: >60 ML/MIN
GFR SERPL CREATININE-BSD FRML MDRD: ABNORMAL ML/MIN/{1.73_M2}
GFR SERPL CREATININE-BSD FRML MDRD: ABNORMAL ML/MIN/{1.73_M2}
GLUCOSE BLD-MCNC: 87 MG/DL (ref 70–99)
HAV IGM SER IA-ACNC: NONREACTIVE
HCT VFR BLD CALC: 43 % (ref 36.3–47.1)
HEMOGLOBIN: 14.1 G/DL (ref 11.9–15.1)
HEPATITIS B CORE IGM ANTIBODY: NONREACTIVE
HEPATITIS B SURFACE ANTIGEN: NONREACTIVE
HEPATITIS C ANTIBODY: NONREACTIVE
HIV AG/AB: NONREACTIVE
IMMATURE GRANULOCYTES: 0 %
IMMATURE RETIC FRACT: 4.7 % (ref 2.7–18.3)
IRON SATURATION: 28 % (ref 20–55)
IRON: 101 UG/DL (ref 37–145)
LACTATE DEHYDROGENASE: 178 U/L (ref 135–214)
LYMPHOCYTES # BLD: 41 % (ref 24–43)
MCH RBC QN AUTO: 30.7 PG (ref 25.2–33.5)
MCHC RBC AUTO-ENTMCNC: 32.8 G/DL (ref 28.4–34.8)
MCV RBC AUTO: 93.7 FL (ref 82.6–102.9)
MONOCYTES # BLD: 8 % (ref 3–12)
NRBC AUTOMATED: 0 PER 100 WBC
PDW BLD-RTO: 12.3 % (ref 11.8–14.4)
PLATELET # BLD: 164 K/UL (ref 138–453)
PLATELET ESTIMATE: ABNORMAL
PMV BLD AUTO: 11.3 FL (ref 8.1–13.5)
POTASSIUM SERPL-SCNC: 3.6 MMOL/L (ref 3.7–5.3)
RBC # BLD: 4.59 M/UL (ref 3.95–5.11)
RBC # BLD: ABNORMAL 10*6/UL
RETIC %: 0.6 % (ref 0.5–1.9)
RETIC HEMOGLOBIN: 31.6 PG (ref 28.2–35.7)
RHEUMATOID FACTOR: <10 IU/ML
SEG NEUTROPHILS: 46 % (ref 36–65)
SEGMENTED NEUTROPHILS ABSOLUTE COUNT: 1.17 K/UL (ref 1.5–8.1)
SODIUM BLD-SCNC: 140 MMOL/L (ref 135–144)
TOTAL IRON BINDING CAPACITY: 355 UG/DL (ref 250–450)
TOTAL PROTEIN: 8.8 G/DL (ref 6.4–8.3)
UNSATURATED IRON BINDING CAPACITY: 254 UG/DL (ref 112–347)
VITAMIN B-12: 1064 PG/ML (ref 232–1245)
WBC # BLD: 2.6 K/UL (ref 3.5–11.3)
WBC # BLD: ABNORMAL 10*3/UL

## 2020-03-13 PROCEDURE — 83615 LACTATE (LD) (LDH) ENZYME: CPT

## 2020-03-13 PROCEDURE — 83540 ASSAY OF IRON: CPT

## 2020-03-13 PROCEDURE — 80074 ACUTE HEPATITIS PANEL: CPT

## 2020-03-13 PROCEDURE — 82607 VITAMIN B-12: CPT

## 2020-03-13 PROCEDURE — 87389 HIV-1 AG W/HIV-1&-2 AB AG IA: CPT

## 2020-03-13 PROCEDURE — 99202 OFFICE O/P NEW SF 15 MIN: CPT | Performed by: INTERNAL MEDICINE

## 2020-03-13 PROCEDURE — G8484 FLU IMMUNIZE NO ADMIN: HCPCS | Performed by: INTERNAL MEDICINE

## 2020-03-13 PROCEDURE — 80053 COMPREHEN METABOLIC PANEL: CPT

## 2020-03-13 PROCEDURE — G8418 CALC BMI BLW LOW PARAM F/U: HCPCS | Performed by: INTERNAL MEDICINE

## 2020-03-13 PROCEDURE — 82728 ASSAY OF FERRITIN: CPT

## 2020-03-13 PROCEDURE — 86665 EPSTEIN-BARR CAPSID VCA: CPT

## 2020-03-13 PROCEDURE — 85045 AUTOMATED RETICULOCYTE COUNT: CPT

## 2020-03-13 PROCEDURE — 36415 COLL VENOUS BLD VENIPUNCTURE: CPT

## 2020-03-13 PROCEDURE — 88184 FLOWCYTOMETRY/ TC 1 MARKER: CPT

## 2020-03-13 PROCEDURE — 83550 IRON BINDING TEST: CPT

## 2020-03-13 PROCEDURE — 99204 OFFICE O/P NEW MOD 45 MIN: CPT | Performed by: INTERNAL MEDICINE

## 2020-03-13 PROCEDURE — G8427 DOCREV CUR MEDS BY ELIG CLIN: HCPCS | Performed by: INTERNAL MEDICINE

## 2020-03-13 PROCEDURE — 86663 EPSTEIN-BARR ANTIBODY: CPT

## 2020-03-13 PROCEDURE — 86664 EPSTEIN-BARR NUCLEAR ANTIGEN: CPT

## 2020-03-13 PROCEDURE — 82746 ASSAY OF FOLIC ACID SERUM: CPT

## 2020-03-13 PROCEDURE — 85025 COMPLETE CBC W/AUTO DIFF WBC: CPT

## 2020-03-13 PROCEDURE — 86431 RHEUMATOID FACTOR QUANT: CPT

## 2020-03-13 PROCEDURE — 88185 FLOWCYTOMETRY/TC ADD-ON: CPT

## 2020-03-13 NOTE — TELEPHONE ENCOUNTER
[facility] ARRIVES AMBULATORY FOR CONSULTATION  DR Crabtree Backers IN TO SEE PATIENT  ORDERS RECEIVED  LABS TODAY  RV 3 WEEKS  US ABD  LABS ON EXIT RHEUMATOID FACTOR SUZAN BARR VIRUS ANTIBODY PANEL HEPATITIS PANEL ACUTE CMP FLOW CYTOMETRY FE TIBC FERRITIN PATH REVIEW SMEAR CDP VITAMIN B12 & FOLATE HIV-1 HIV-2   US ABD SCHEDULED FOR 03/18/20 @11:30AM  ARRIVE BY 11:15AM ST MARITA CLIFTON MD VISIT 04/03/20 @10:45AM  AVS PRINTED AND GIVEN TO PATIENT WITH INSTRUCTIONS  PATIENT DISCHARGED AMBULATORY

## 2020-03-13 NOTE — PROGRESS NOTES
Patient ID: Yumiko Taylor, 1960, [id number], 64 y.o. Referred by : ELISHA Aldana CNP    Reason for consultation:   leukopenia  HISTORY OF PRESENT ILLNESS:    Hematology history: This is a 43-year-old -American female who was seen in initial consultation visit for chronic leukopenia. Review of her records indicate that she has low white blood cell count since 2013 and most recent WBCs are around 3.0. She denies any recurrent infections. She denies any unintentional weight loss drenching night sweats or fever chills. No history of smoking or alcohol abuse. She has history of irritable bowel syndrome and has been following with gastroenterologist for intermittent abdominal pain. She was supposed to have CT scan of the abdomen pelvis last month but patient refused to go as she did not want to drink the contrast dye. 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  45 g 3    fluticasone (FLONASE) 50 MCG/ACT nasal spray instill 2 sprays into each nostril once daily 16 g 5    ibuprofen (ADVIL;MOTRIN) 800 MG tablet take 1 tablet by mouth four times a day if needed for pain 60 tablet 2    Elastic Bandages & Supports (LUMBAR BACK BRACE/SUPPORT PAD) MISC 1 each by Does not apply route daily as Maternal Grandfather     No Known Problems Maternal Grandmother     No Known Problems Paternal Grandmother     No Known Problems Paternal Grandfather         REVIEW OF SYSTEM:     Constitutional: No fever or chills. No night sweats, no weight loss   Eyes: No eye discharge, double vision, or eye pain   HEENT: negative for sore mouth, sore throat, hoarseness and voice change   Respiratory: negative for cough , sputum, dyspnea, wheezing, hemoptysis, chest pain   Cardiovascular: negative for chest pain, dyspnea, palpitations, orthopnea, PND   Gastrointestinal: negative for nausea, vomiting, diarrhea, constipation, abdominal pain, Dysphagia, hematemesis and hematochezia   Genitourinary: negative for frequency, dysuria, nocturia, urinary incontinence, and hematuria   Integument: negative for rash, skin lesions, bruises.    Hematologic/Lymphatic: negative for easy bruising, bleeding, lymphadenopathy, petechiae and swelling/edema   Endocrine: negative for heat or cold intolerance, tremor, weight changes, change in bowel habits and hair loss   Musculoskeletal: negative for myalgias, arthralgias, pain, joint swelling,and bone pain   Neurological: negative for headaches, dizziness, seizures, weakness, numbness       OBJECTIVE:         Vitals:    03/13/20 1114   BP: 115/70   Pulse: 70   Resp: 16   Temp: 98 °F (36.7 °C)       PHYSICAL EXAM:   General appearance - well appearing, no in pain or distress   Mental status - alert and cooperative   Eyes - pupils equal and reactive, extraocular eye movements intact   Ears - bilateral TM's and external ear canals normal   Mouth - mucous membranes moist, pharynx normal without lesions   Neck - supple, no significant adenopathy   Lymphatics - no palpable lymphadenopathy, no hepatosplenomegaly   Chest - clear to auscultation, no wheezes, rales or rhonchi, symmetric air entry   Heart - normal rate, regular rhythm, normal S1, S2, no murmurs, rubs, clicks or gallops   Abdomen - soft, nontender, nondistended, no masses or organomegaly   Neurological - alert, oriented, normal speech, no focal findings or movement disorder noted   Musculoskeletal - no joint tenderness, deformity or swelling   Extremities - peripheral pulses normal, no pedal edema, no clubbing or cyanosis   Skin - normal coloration and turgor, no rashes, no suspicious skin lesions noted ,      LABORATORY DATA:     Lab Results   Component Value Date    WBC 3.0 (L) 02/18/2020    HGB 13.4 02/18/2020    HCT 41.8 02/18/2020    MCV 94.1 02/18/2020     02/18/2020    LYMPHOPCT 48 (H) 01/28/2019    RBC 4.44 02/18/2020    MCH 30.2 02/18/2020    MCHC 32.1 02/18/2020    RDW 12.6 02/18/2020    MONOPCT 12 (H) 01/28/2019    BASOPCT 0 01/28/2019    NEUTROABS 0.84 (L) 01/28/2019    LYMPHSABS 1.34 01/28/2019    MONOSABS 0.34 01/28/2019    EOSABS 0.25 01/28/2019    BASOSABS 0.00 01/28/2019         Chemistry        Component Value Date/Time     01/28/2019 0949    K 3.6 (L) 01/28/2019 0949     01/28/2019 0949    CO2 26 01/28/2019 0949    BUN 18 01/28/2019 0949    CREATININE 0.66 01/28/2019 0949        Component Value Date/Time    CALCIUM 9.8 01/28/2019 0949    ALKPHOS 58 01/28/2019 0949    AST 17 01/28/2019 0949    ALT 16 01/28/2019 0949    BILITOT 1.14 01/28/2019 0949        PATHOLOGY DATA:   Reviewed  IMAGING DATA:    Reviewed  ASSESSMENT:    This is a 16-year-old -American female with chronic leukopenia. Her records indicate that she has leukopenia since at least 2013 with white cell count ranging between 2.8-3.0. Her hemoglobin is within normal limits and platelets are normal.  I discussed multiple etiologies of low blood count. Will rule out any nutritional deficiencies, infections, will check flow cytometry and HIV. If the work-up is negative she may need bone marrow biopsy. She has history of lupus in her mother. She does have some joint pains and I will check for rheumatoid factors.   Patient's questions were sought and answered to her satisfaction and she agreed to proceed with the plan. PLAN:   Labs today  Ultrasound abdomen  Return to clinic in 3 weeks to discuss lab results and further recommendations      Andre Abebe MD  Hematologist/Medical Oncologist    I spent more than 60 minutes examining, evaluating, reviewing data and counseling the patient. Greater than 50% of that time was spent face-to-face with the patient in counseling and coordinating her care. This note is created with the assistance of a speech recognition program.  While intending to generate a document that actually reflects the content of the visit, the document can still have some errors including those of syntax and sound a like substitutions which may escape proof reading. It such instances, actual meaning can be extrapolated by contextual diversion.

## 2020-03-14 LAB
MISCELLANEOUS LAB TEST RESULT: NORMAL
TEST NAME: NORMAL

## 2020-03-16 LAB
PATHOLOGIST REVIEW: NORMAL
SURGICAL PATHOLOGY REPORT: NORMAL

## 2020-03-17 LAB
EBV EARLY ANTIGEN IGG: 70 U/ML
EBV INTERPRETATION: ABNORMAL
EBV NUCLEAR AG AB: 717 U/ML
EPSTEIN-BARR VCA IGG: 1602 U/ML
EPSTEIN-BARR VCA IGM: 49 U/ML

## 2020-04-02 ENCOUNTER — TELEPHONE (OUTPATIENT)
Dept: ONCOLOGY | Age: 60
End: 2020-04-02

## 2020-04-27 ENCOUNTER — HOSPITAL ENCOUNTER (OUTPATIENT)
Facility: MEDICAL CENTER | Age: 60
End: 2020-04-27
Payer: COMMERCIAL

## 2020-04-29 ENCOUNTER — TELEPHONE (OUTPATIENT)
Dept: ONCOLOGY | Age: 60
End: 2020-04-29

## 2020-04-29 ENCOUNTER — TELEMEDICINE (OUTPATIENT)
Dept: ONCOLOGY | Age: 60
End: 2020-04-29
Payer: COMMERCIAL

## 2020-04-29 VITALS — TEMPERATURE: 97.4 F | HEART RATE: 60 BPM

## 2020-04-29 PROCEDURE — 3017F COLORECTAL CA SCREEN DOC REV: CPT | Performed by: INTERNAL MEDICINE

## 2020-04-29 PROCEDURE — G8427 DOCREV CUR MEDS BY ELIG CLIN: HCPCS | Performed by: INTERNAL MEDICINE

## 2020-04-29 PROCEDURE — 99215 OFFICE O/P EST HI 40 MIN: CPT | Performed by: INTERNAL MEDICINE

## 2020-04-29 NOTE — PROGRESS NOTES
on file     Active member of club or organization: Not on file     Attends meetings of clubs or organizations: Not on file     Relationship status: Not on file    Intimate partner violence     Fear of current or ex partner: Not on file     Emotionally abused: Not on file     Physically abused: Not on file     Forced sexual activity: Not on file   Other Topics Concern    Not on file   Social History Narrative    Not on file       Family History   Problem Relation Age of Onset    Other Mother         lupus    Stroke Father     Diabetes Maternal Grandfather     No Known Problems Maternal Grandmother     No Known Problems Paternal Grandmother     No Known Problems Paternal Grandfather         REVIEW OF SYSTEM:     Constitutional: No fever or chills. No night sweats, no weight loss   Eyes: No eye discharge, double vision, or eye pain   HEENT: negative for sore mouth, sore throat, hoarseness and voice change   Respiratory: negative for cough , sputum, dyspnea, wheezing, hemoptysis, chest pain   Cardiovascular: negative for chest pain, dyspnea, palpitations, orthopnea, PND   Gastrointestinal: negative for nausea, vomiting, diarrhea, constipation, abdominal pain, Dysphagia, hematemesis and hematochezia   Genitourinary: negative for frequency, dysuria, nocturia, urinary incontinence, and hematuria   Integument: negative for rash, skin lesions, bruises.    Hematologic/Lymphatic: negative for easy bruising, bleeding, lymphadenopathy, petechiae and swelling/edema   Endocrine: negative for heat or cold intolerance, tremor, weight changes, change in bowel habits and hair loss   Musculoskeletal: negative for myalgias, arthralgias, pain, joint swelling,and bone pain   Neurological: negative for headaches, dizziness, seizures, weakness, numbness   OBJECTIVE:         Vitals:    04/29/20 1659   Pulse: 60   Temp: 97.4 °F (36.3 °C)     PHYSICAL EXAMINATION:  [ INSTRUCTIONS:  \"[x]\" Indicates a positive item  \"[]\" Indicates a such instances, actual meaning can be extrapolated by contextual diversion. Janice Ortiz is a 64 y.o. female being evaluated by a Virtual Visit (video visit) encounter to address concerns as mentioned above. A caregiver was present when appropriate. Due to this being a TeleHealth encounter (During HRUSZ-70 public health emergency), evaluation of the following organ systems was limited: Vitals/Constitutional/EENT/Resp/CV/GI//MS/Neuro/Skin/Heme-Lymph-Imm. Pursuant to the emergency declaration under the [address] [place] [address], [address] [place] [address] authority and the [employer] and [employer] Act, this Virtual Visit was conducted with patient's (and/or legal guardian's) consent, to reduce the patient's risk of exposure to COVID-19 and provide necessary medical care. The patient (and/or legal guardian) has also been advised to contact this office for worsening conditions or problems, and seek emergency medical treatment and/or call 911 if deemed necessary. Patient identification was verified at the start of the visit: Yes    Total time spent on this encounter: 36    Services were provided through a video synchronous discussion virtually to substitute for in-person clinic visit. Patient and provider were located at their individual homes. --Sophia Dawn MD     An electronic signature was used to authenticate this note.

## 2020-06-11 ENCOUNTER — VIRTUAL VISIT (OUTPATIENT)
Dept: FAMILY MEDICINE CLINIC | Age: 60
End: 2020-06-11
Payer: COMMERCIAL

## 2020-06-11 PROCEDURE — G8418 CALC BMI BLW LOW PARAM F/U: HCPCS | Performed by: INTERNAL MEDICINE

## 2020-06-11 PROCEDURE — 99213 OFFICE O/P EST LOW 20 MIN: CPT | Performed by: INTERNAL MEDICINE

## 2020-06-11 PROCEDURE — G8427 DOCREV CUR MEDS BY ELIG CLIN: HCPCS | Performed by: INTERNAL MEDICINE

## 2020-06-11 PROCEDURE — 3017F COLORECTAL CA SCREEN DOC REV: CPT | Performed by: INTERNAL MEDICINE

## 2020-06-11 PROCEDURE — 1036F TOBACCO NON-USER: CPT | Performed by: INTERNAL MEDICINE

## 2020-06-11 RX ORDER — LIDOCAINE 50 MG/G
OINTMENT TOPICAL
Qty: 250 G | Refills: 3 | Status: SHIPPED | OUTPATIENT
Start: 2020-06-11

## 2020-06-11 NOTE — PROGRESS NOTES
gait with no signs of ataxia         [x] Normal range of motion of neck        [] Abnormal-       Neurological:        [x] No Facial Asymmetry (Cranial nerve 7 motor function) (limited exam to video visit)          [x] No gaze palsy        [] Abnormal-         Skin:        [x] No significant exanthematous lesions or discoloration noted on facial skin         [] Abnormal-            Psychiatric:       [x] Normal Affect [x] No Hallucinations        [] Abnormal-     Other pertinent observable physical exam findings-     ASSESSMENT/PLAN:  1. Arthralgia, unspecified joint  - lidocaine (XYLOCAINE) 5 % ointment; Apply topically as needed. Dispense: 250 g; Refill: 3      No follow-ups on file. Therita Cluster is a 64 y.o. female being evaluated by a Virtual Visit (video visit) encounter to address concerns as mentioned above. A caregiver was present when appropriate. Due to this being a TeleHealth encounter (During PKSOF-19 public health emergency), evaluation of the following organ systems was limited: Vitals/Constitutional/EENT/Resp/CV/GI//MS/Neuro/Skin/Heme-Lymph-Imm. Pursuant to the emergency declaration under the [address] [place] [address] authority and the [employer] and [employer] Act, this Virtual Visit was conducted with patient's (and/or legal guardian's) consent, to reduce the patient's risk of exposure to COVID-19 and provide necessary medical care. The patient (and/or legal guardian) has also been advised to contact this office for worsening conditions or problems, and seek emergency medical treatment and/or call 911 if deemed necessary. Patient identification was verified at the start of the visit: Yes    Total time spent on this encounter: Not billed by time    Services were provided through a video synchronous discussion virtually to substitute for in-person clinic visit.  Patient and provider were located at their individual

## 2020-07-16 ENCOUNTER — TELEPHONE (OUTPATIENT)
Dept: ONCOLOGY | Age: 60
End: 2020-07-16

## 2020-07-16 NOTE — TELEPHONE ENCOUNTER
Spoke to patient about missing her US of Abdomen appt. and f/u with Dr Rylan Rudd on 7/24/20. Patient stated she did not get reminder call for US, I explained that they do not do reminder calls in the Imaging Dept., to put it on her calendar , gave patient scheduling phone number , asked her to reschedule US, to call us back if she cannot get scheduled before her appt. With Dr. Rylan Rudd on 7/24/20. Patient verbalized understanding .

## 2020-07-21 ENCOUNTER — HOSPITAL ENCOUNTER (OUTPATIENT)
Facility: MEDICAL CENTER | Age: 60
End: 2020-07-21
Payer: COMMERCIAL

## 2020-08-27 ENCOUNTER — VIRTUAL VISIT (OUTPATIENT)
Dept: FAMILY MEDICINE CLINIC | Age: 60
End: 2020-08-27
Payer: COMMERCIAL

## 2020-08-27 PROBLEM — R10.84 GENERALIZED ABDOMINAL PAIN: Status: ACTIVE | Noted: 2020-08-27

## 2020-08-27 PROBLEM — D70.9 NEUTROPENIA (HCC): Status: ACTIVE | Noted: 2020-08-27

## 2020-08-27 PROBLEM — K58.2 IRRITABLE BOWEL SYNDROME WITH BOTH CONSTIPATION AND DIARRHEA: Status: ACTIVE | Noted: 2020-08-27

## 2020-08-27 PROCEDURE — G8427 DOCREV CUR MEDS BY ELIG CLIN: HCPCS | Performed by: INTERNAL MEDICINE

## 2020-08-27 PROCEDURE — 1036F TOBACCO NON-USER: CPT | Performed by: INTERNAL MEDICINE

## 2020-08-27 PROCEDURE — G8418 CALC BMI BLW LOW PARAM F/U: HCPCS | Performed by: INTERNAL MEDICINE

## 2020-08-27 PROCEDURE — 3017F COLORECTAL CA SCREEN DOC REV: CPT | Performed by: INTERNAL MEDICINE

## 2020-08-27 PROCEDURE — 99214 OFFICE O/P EST MOD 30 MIN: CPT | Performed by: INTERNAL MEDICINE

## 2020-08-27 RX ORDER — FLUTICASONE PROPIONATE 50 MCG
SPRAY, SUSPENSION (ML) NASAL
Qty: 16 G | Refills: 5 | Status: SHIPPED | OUTPATIENT
Start: 2020-08-27

## 2020-08-27 NOTE — PROGRESS NOTES
2020    TELEHEALTH EVALUATION -- Audio/Visual (During QDXKC-70 public health emergency)    HPI:    Leesa Dias (:  1960) has requested an audio/video evaluation for the following concern(s):    She has been having abdominal pain and diarrhea. She has been having some vague pain after meals. She did see GI a while ago, never followed up or got the requested testing completed. She wants to use probiotics and prednisone because she feels there is a lot of inflammation in her body at the moment. She wants a prescription for prednisone today because family members told her she might have Crohn's and/or lupus and she should get on prednisone to help. She is not interested in any other medications and she is taking about six different probiotic products. Review of Systems   Constitutional: Negative for fatigue, fever and unexpected weight change. Respiratory: Negative for cough, choking, chest tightness, shortness of breath and wheezing. Cardiovascular: Negative for chest pain, palpitations and leg swelling. Gastrointestinal: Positive for abdominal pain, constipation, diarrhea, nausea and rectal pain. Negative for anal bleeding, blood in stool and vomiting. Endocrine: Negative. Musculoskeletal: Negative for joint swelling and myalgias. Skin: Negative. Neurological: Negative for dizziness. Psychiatric/Behavioral: Negative for sleep disturbance. All other systems reviewed and are negative. Prior to Visit Medications    Medication Sig Taking? Authorizing Provider   lidocaine (XYLOCAINE) 5 % ointment Apply topically as needed. Yes Anselmo Arnold MD   capsaicin (ZOSTRIX) 0.025 % cream Apply topically 2 times daily Apply topically 2 times daily.  Yes Historical Provider, MD   b complex vitamins capsule Take 1 capsule by mouth daily Yes Historical Provider, MD   NONFORMULARY Herbal digestive suppliment Yes Historical Provider, MD   NONFORMULARY instaflex supplement for joints Yes Historical Provider, MD   fluticasone (FLONASE) 50 MCG/ACT nasal spray instill 2 sprays into each nostril once daily Yes [employer], APRN - CNP   acetaminophen (TYLENOL) 500 MG tablet Take 500 mg by mouth every 6 hours as needed.  Yes Historical Provider, MD   Elastic Bandages & Supports (LUMBAR BACK BRACE/SUPPORT PAD) MISC 1 each by Does not apply route daily as needed (back pain)  Queta Noonan MD       Social History     Tobacco Use    Smoking status: Never Smoker    Smokeless tobacco: Never Used   Substance Use Topics    Alcohol use: Not Currently     Comment: occasional alcohol comsumption    Drug use: No        Past Medical History:   Diagnosis Date    Anemia     Back pain 2/3/2014    Blood transfusion     Degeneration of lumbar or lumbosacral intervertebral disc 2/3/2014    GERD (gastroesophageal reflux disease)     Hyperlipidemia 5/9/2013    Notalgia paresthetica 1/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    Syncopal episodes    ,   Past Surgical History:   Procedure Laterality Date    COLONOSCOPY      HYSTERECTOMY      total    TOOTH EXTRACTION Left 10/2014    [facility]    UPPER GASTROINTESTINAL ENDOSCOPY     ,   Family History   Problem Relation Age of Onset    Other Mother         lupus    Stroke Father     Diabetes Maternal Grandfather     No Known Problems Maternal Grandmother     No Known Problems Paternal Grandmother     No Known Problems Paternal Grandfather        PHYSICAL EXAMINATION:  [ INSTRUCTIONS:  \"[x]\" Indicates a positive item  \"[]\" Indicates a negative item  -- DELETE ALL ITEMS NOT EXAMINED]  Vital Signs: (As obtained by patient/caregiver or practitioner observation)    Blood pressure-  Heart rate-    Respiratory rate-    Temperature-  Pulse oximetry-     Constitutional: [x] Appears well-developed and well-nourished [x] No apparent distress      [] Abnormal-   Mental status  [x] Alert and awake  [x] Oriented to person/place/time [x]Able to follow commands      Eyes:  EOM    [x] the [employer] Act, [facility] waiver authority and the Coronavirus Preparedness and [employer] Act, this Virtual Visit was conducted with patient's (and/or legal guardian's) consent, to reduce the patient's risk of exposure to COVID-19 and provide necessary medical care. The patient (and/or legal guardian) has also been advised to contact this office for worsening conditions or problems, and seek emergency medical treatment and/or call 911 if deemed necessary. Patient identification was verified at the start of the visit: Yes    Total time spent on this encounter: 20 minutes    Services were provided through a video synchronous discussion virtually to substitute for in-person clinic visit. Patient and provider were located at their individual homes. --ELIZABETH Wilson MD on 8/27/2020 at 1:42 PM    An electronic signature was used to authenticate this note.

## 2020-08-27 NOTE — PROGRESS NOTES
Pt is doing a virtual visit today due to having inflammation in her mid back to lower back and stomach area. She states she has IBS and possibly crohn's and is requesting prednisone to help with the inflammation. She was told by family members that they used prednisone for that.

## 2020-08-31 ENCOUNTER — TELEPHONE (OUTPATIENT)
Dept: FAMILY MEDICINE CLINIC | Age: 60
End: 2020-08-31

## 2020-08-31 NOTE — TELEPHONE ENCOUNTER
Sujata, , from [facility] called wanting to know if there was any additional input you had for the patient or anything you want them to follow up on.

## 2020-09-07 ASSESSMENT — ENCOUNTER SYMPTOMS
CHOKING: 0
BLOOD IN STOOL: 0
DIARRHEA: 1
CHEST TIGHTNESS: 0
COUGH: 0
CONSTIPATION: 1
VOMITING: 0
ABDOMINAL PAIN: 1
NAUSEA: 1
ANAL BLEEDING: 0
WHEEZING: 0
SHORTNESS OF BREATH: 0
RECTAL PAIN: 1

## 2020-09-29 ENCOUNTER — VIRTUAL VISIT (OUTPATIENT)
Dept: FAMILY MEDICINE CLINIC | Age: 60
End: 2020-09-29
Payer: COMMERCIAL

## 2020-09-29 PROCEDURE — 1036F TOBACCO NON-USER: CPT | Performed by: NURSE PRACTITIONER

## 2020-09-29 PROCEDURE — 99213 OFFICE O/P EST LOW 20 MIN: CPT | Performed by: NURSE PRACTITIONER

## 2020-09-29 PROCEDURE — G0439 PPPS, SUBSEQ VISIT: HCPCS | Performed by: NURSE PRACTITIONER

## 2020-09-29 PROCEDURE — G8418 CALC BMI BLW LOW PARAM F/U: HCPCS | Performed by: NURSE PRACTITIONER

## 2020-09-29 PROCEDURE — G8427 DOCREV CUR MEDS BY ELIG CLIN: HCPCS | Performed by: NURSE PRACTITIONER

## 2020-09-29 PROCEDURE — 3017F COLORECTAL CA SCREEN DOC REV: CPT | Performed by: NURSE PRACTITIONER

## 2020-09-29 RX ORDER — IBUPROFEN 800 MG/1
800 TABLET ORAL EVERY 6 HOURS PRN
COMMUNITY
End: 2020-11-23

## 2020-09-29 ASSESSMENT — ENCOUNTER SYMPTOMS
COUGH: 0
SHORTNESS OF BREATH: 0

## 2020-09-29 ASSESSMENT — PATIENT HEALTH QUESTIONNAIRE - PHQ9
SUM OF ALL RESPONSES TO PHQ QUESTIONS 1-9: 1
1. LITTLE INTEREST OR PLEASURE IN DOING THINGS: 0
SUM OF ALL RESPONSES TO PHQ QUESTIONS 1-9: 1
2. FEELING DOWN, DEPRESSED OR HOPELESS: 1
SUM OF ALL RESPONSES TO PHQ9 QUESTIONS 1 & 2: 1

## 2020-09-29 ASSESSMENT — LIFESTYLE VARIABLES: HOW OFTEN DO YOU HAVE A DRINK CONTAINING ALCOHOL: 0

## 2020-09-29 NOTE — PATIENT INSTRUCTIONS
Personalized Preventive Plan for Reginold Flick - 9/29/2020  Medicare offers a range of preventive health benefits. Some of the tests and screenings are paid in full while other may be subject to a deductible, co-insurance, and/or copay. Some of these benefits include a comprehensive review of your medical history including lifestyle, illnesses that may run in your family, and various assessments and screenings as appropriate. After reviewing your medical record and screening and assessments performed today your provider may have ordered immunizations, labs, imaging, and/or referrals for you. A list of these orders (if applicable) as well as your Preventive Care list are included within your After Visit Summary for your review. Other Preventive Recommendations:    · A preventive eye exam performed by an eye specialist is recommended every 1-2 years to screen for glaucoma; cataracts, macular degeneration, and other eye disorders. · A preventive dental visit is recommended every 6 months. · Try to get at least 150 minutes of exercise per week or 10,000 steps per day on a pedometer . · Order or download the FREE \"Exercise & Physical Activity: Your Everyday Guide\" from [employer]. Call 0-810.661.1294 or search [employer] online. · You need 1424-8494 mg of calcium and 0107-6945 IU of vitamin D per day. It is possible to meet your calcium requirement with diet alone, but a vitamin D supplement is usually necessary to meet this goal.  · When exposed to the sun, use a sunscreen that protects against both UVA and UVB radiation with an SPF of 30 or greater. Reapply every 2 to 3 hours or after sweating, drying off with a towel, or swimming. · Always wear a seat belt when traveling in a car. Always wear a helmet when riding a bicycle or motorcycle.

## 2020-09-29 NOTE — PROGRESS NOTES
Medicare Annual Wellness Visit  Name: Andreia Roach Date: 2020   MRN: [id number] Sex: Female   Age: 64 y.o. Ethnicity: Non-/Non    : 1960 Race: Karina Kim is here for Medicare AWV    Screenings for behavioral, psychosocial and functional/safety risks, and cognitive dysfunction are all negative except as indicated below. These results, as well as other patient data from the [address] form, are documented in Flowsheets linked to this Encounter. No Known Allergies      Prior to Visit Medications    Medication Sig Taking? Authorizing Provider   ibuprofen (ADVIL;MOTRIN) 800 MG tablet Take 800 mg by mouth every 6 hours as needed for Pain Yes Historical Provider, MD   Cyanocobalamin (B-12 PO) Take by mouth Yes Historical Provider, MD   Misc. Devices (BED WEDGE) MISC 1 Device by Does not apply route once for 1 dose Yes Kady Grimes, APRN - CNP   fluticasone (FLONASE) 50 MCG/ACT nasal spray instill 2 sprays into each nostril once daily Yes Queta Hernandez MD   lidocaine (XYLOCAINE) 5 % ointment Apply topically as needed. Yes Fish Doan MD   capsaicin (ZOSTRIX) 0.025 % cream Apply topically 2 times daily Apply topically 2 times daily. Yes Historical Provider, MD   NONFORMULARY Herbal digestive suppliment Yes Historical Provider, MD   Elastic Bandages & Supports (LUMBAR BACK BRACE/SUPPORT PAD) MISC 1 each by Does not apply route daily as needed (back pain) Yes Fish Doan MD   acetaminophen (TYLENOL) 500 MG tablet Take 500 mg by mouth every 6 hours as needed.  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    Syncopal episodes        Past Surgical History:   Procedure Laterality Date    COLONOSCOPY      HYSTERECTOMY      total    TOOTH EXTRACTION Left 10/2014    [facility]    UPPER GASTROINTESTINAL ENDOSCOPY           Family History   Problem Relation Age of Onset    Other Mother         lupus    Stroke Father     Diabetes Maternal Grandfather     No Known Problems Maternal Grandmother     No Known Problems Paternal Grandmother     No Known Problems Paternal Grandfather        CareTeam (Including outside providers/suppliers regularly involved in providing care):   Patient Care Team:  ELISHA Ford CNP as PCP - General (Certified Nurse Practitioner)  ELISHA Ford CNP [facility]  Pam Gonzalez MD as Consulting Physician (Gastroenterology)  Mars Santoro MD as Surgeon (Orthopedic Surgery)  MD Portillo Ricks APRN - CNP as Referring Physician (Certified Nurse Practitioner)    Wt Readings from Last 3 Encounters:   03/13/20 120 lb 3.2 oz (54.5 kg)   10/10/19 118 lb (53.5 kg)   09/24/19 116 lb 9.6 oz (52.9 kg)     No flowsheet data found. There is no height or weight on file to calculate BMI. Based upon direct observation of the patient, evaluation of cognition reveals recent and remote memory intact. Patient's complete Health Risk Assessment and screening values have been reviewed and are found in Flowsheets. The following problems were reviewed today and where indicated follow up appointments were made and/or referrals ordered. Positive Risk Factor Screenings with Interventions:     Fall Risk:  Timed Up and Go Test > 12 seconds?  (Complete if either Fall Risk answers are Yes): no  2 or more falls in past year?: no  Fall with injury in past year?: (!) yes  Fall Risk Interventions:    · Home safety tips provided   · Pt reports she had a syncopal episode, Administered    PPD Test 06/24/2013        Health Maintenance   Topic Date Due    DTaP/Tdap/Td vaccine (1 - Tdap) 01/01/1981    Shingles Vaccine (1 of 2) 01/01/2012    Annual Wellness Visit (AWV)  05/29/2019    Flu vaccine (1) 09/01/2020    Breast cancer screen  05/22/2021    Colon cancer screen colonoscopy  08/30/2023    Lipid screen  01/23/2024    HIV screen  Completed    Hepatitis A vaccine  Aged Out    Hepatitis B vaccine  Aged Out    Hib vaccine  Aged Out    Meningococcal (ACWY) vaccine  Aged Out    Pneumococcal 0-64 years Vaccine  Aged Out    Hepatitis C screen  Discontinued     Recommendations for Preventive Services Due: see orders and patient instructions/AVS.  . Recommended screening schedule for the next 5-10 years is provided to the patient in written form: see Patient Instructions/AVS.    Amy Arriaga was seen today for medicare awv. Diagnoses and all orders for this visit:    Routine general medical examination at a health care facility    Acute strain of neck muscle, initial encounter    Other orders  -     Misc. Devices (BED WEDGE) MISC; 1 Device by Does not apply route once for 1 dose              Rylie Gupta is a 64 y.o. female being evaluated by a Virtual Visit (video and audio) encounter to address concerns as mentioned above. A caregiver was present when appropriate. Due to this being a TeleHealth encounter (During KJUVZ-58 public health emergency), evaluation of the following organ systems was limited: Vitals/Constitutional/EENT/Resp/CV/GI//MS/Neuro/Skin/Heme-Lymph-Imm. Pursuant to the emergency declaration under the [address] [place] [address], [address] [place] [address] authority and the [employer] and [employer] Act, this Virtual Visit was conducted with patient's (and/or legal guardian's) consent, to reduce the patient's risk of exposure to COVID-19 and provide necessary medical care.   The patient (and/or legal guardian) has also been advised to contact this office for worsening conditions or problems, and seek emergency medical treatment and/or call 911 if deemed necessary. Patient identification was verified at the start of the visit: Yes    Services were provided through a video synchronous discussion virtually to substitute for in-person clinic visit. Patient and provider were located at their individual homes. --Sang AlokELISHA blake CNP on 2020 at 11:25 AM    An electronic signature was used to authenticate this note. VISIT LOCATION: Home    TELEHEALTH EVALUATION -- Audio/Visual (During PBAWL-11 public health emergency)    Due to COVID 23 outbreak, patient's office visit was converted to a virtual visit. Patient was contacted and agreed to proceed with a virtual visit via [employer]. me  The risks and benefits of converting to a virtual visit were discussed in light of the current infectious disease epidemic. Patient also understood that insurance coverage and co-pays are up to their individual insurance plans. Eveleen Aase (:  1960) has requested an audio/video evaluation for the following concern(s):    Chief Complaint:   HPI:  Eveleen Aase is here for virtual visit for  Neck pain. Pulled muscle in neck. uring oregano oil. Feels it is the way she is sleeping. Neck seems to be bothering her for a few weeks. Takes motrin when it is bad, knocks the edge off.  tylenol doesn't help. No fever, no other complaints or abnormalities. She would like a wedge pillow ordered to help position her in bed. Review of Systems   Constitutional: Negative for chills and fever. Respiratory: Negative for cough and shortness of breath. Cardiovascular: Negative for chest pain, palpitations and leg swelling. Musculoskeletal: Positive for neck pain. Prior to Visit Medications    Medication Sig Taking?  Authorizing Provider   ibuprofen (ADVIL;MOTRIN) 800 MG tablet Take 800 mg by mouth every 6 hours as needed for Pain Yes Historical Provider, MD   Cyanocobalamin (B-12 PO) Take by mouth Yes Historical Provider, MD   Misc. Devices (BED WEDGE) MISC 1 Device by Does not apply route once for 1 dose Yes ELISHA Basurto CNP   fluticasone (FLONASE) 50 MCG/ACT nasal spray instill 2 sprays into each nostril once daily Yes Queta Cason MD   lidocaine (XYLOCAINE) 5 % ointment Apply topically as needed. Yes Angelito Page MD   capsaicin (ZOSTRIX) 0.025 % cream Apply topically 2 times daily Apply topically 2 times daily. Yes Historical Provider, MD   NONFORMULARY Herbal digestive suppliment Yes Historical Provider, MD   Elastic Bandages & Supports (LUMBAR BACK BRACE/SUPPORT PAD) MISC 1 each by Does not apply route daily as needed (back pain) Yes Angelito Page MD   acetaminophen (TYLENOL) 500 MG tablet Take 500 mg by mouth every 6 hours as needed.  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    Syncopal episodes    ,   Past Surgical History:   Procedure Laterality Date    COLONOSCOPY      HYSTERECTOMY      total    TOOTH EXTRACTION Left 10/2014    [facility]    UPPER GASTROINTESTINAL ENDOSCOPY               [] OTHER:    Constitutional: [x] Appears well-developed and well-nourished [] No apparent distress                            [] Abnormal-   Mental status  [x] Alert and awake  [x] Oriented to person/place/time [x]Able to follow commands       Eyes:  EOM    [x]  Normal  [] Abnormal-  Sclera  [x]  Normal  [] Abnormal -         Discharge [x]  None visible  [] Abnormal - HENT:   [x] Normocephalic, atraumatic. [] Abnormal   [] Mouth/Throat: Mucous membranes are moist.      External Ears [x] Normal  [] Abnormal-      Neck: [x] No visualized mass      Pulmonary/Chest: [x] Respiratory effort normal.  [] No visualized signs of difficulty breathing or respiratory distress        [] Abnormal-      Musculoskeletal:   [] Normal gait with no signs of ataxia         [x] Normal range of motion of neck        [] Abnormal-   [] Motor grossly intact in visible upper extremities    [] Motor grossly intact in visible lower extremities        Neurological:        [x] No Facial Asymmetry (Cranial nerve 7 motor function) (limited exam to video visit)                       [x] No gaze palsy        [] Abnormal-         Skin:                     [x] No significant exanthematous lesions or discoloration noted on facial skin         [] Abnormal-                                  Psychiatric:           [x] Normal Affect [] No Hallucinations        [] Abnormal- [] Normal Mood  [] Anxious appearing    [] Depressed appearing  [] Confused appearing      Due to this being a TeleHealth encounter, evaluation of the following organ systems is limited: Vitals/Constitutional/EENT/Resp/CV/GI//MS/Neuro/Skin/Heme-Lymph-Imm. ASSESSMENT/PLAN:  Encounter Diagnoses   Name Primary?  Routine general medical examination at a health care facility Yes    Acute strain of neck muscle, initial encounter        Orders Placed This Encounter   Medications    Misc. Devices (BED WEDGE) MISC     Si Device by Does not apply route once for 1 dose     Dispense:  1 each     Refill:  0         Return for Medicare Annual Wellness Visit in 1 year. The time that was spent with the family/patient addressing care on this video call and chart review was 15 minutes. An  electronic signature was used to authenticate this note.     --Rolland Gowers, APRN - CNP on 2020 at 11:25 AM        Pursuant to the emergency declaration under the [facility] waiver authority and the [employer] and [employer] Act, this Virtual  Visit was conducted, with patient's consent, to reduce the patient's risk of exposure to COVID-19 and provide continuity of care for an established patient. Services were provided through a telephone discussion virtually to substitute for in-person clinic visit.

## 2020-11-23 RX ORDER — IBUPROFEN 800 MG/1
TABLET ORAL
Qty: 90 TABLET | Refills: 1 | Status: SHIPPED | OUTPATIENT
Start: 2020-11-23

## 2021-01-05 ENCOUNTER — VIRTUAL VISIT (OUTPATIENT)
Dept: FAMILY MEDICINE CLINIC | Age: 61
End: 2021-01-05
Payer: COMMERCIAL

## 2021-01-05 DIAGNOSIS — R63.4 WEIGHT LOSS: Primary | ICD-10-CM

## 2021-01-05 DIAGNOSIS — Z87.898 HISTORY OF SYNCOPE: ICD-10-CM

## 2021-01-05 PROCEDURE — G8419 CALC BMI OUT NRM PARAM NOF/U: HCPCS | Performed by: NURSE PRACTITIONER

## 2021-01-05 PROCEDURE — 99213 OFFICE O/P EST LOW 20 MIN: CPT | Performed by: NURSE PRACTITIONER

## 2021-01-05 PROCEDURE — G8427 DOCREV CUR MEDS BY ELIG CLIN: HCPCS | Performed by: NURSE PRACTITIONER

## 2021-01-05 PROCEDURE — 1036F TOBACCO NON-USER: CPT | Performed by: NURSE PRACTITIONER

## 2021-01-05 PROCEDURE — 3017F COLORECTAL CA SCREEN DOC REV: CPT | Performed by: NURSE PRACTITIONER

## 2021-01-05 PROCEDURE — G8484 FLU IMMUNIZE NO ADMIN: HCPCS | Performed by: NURSE PRACTITIONER

## 2021-01-05 ASSESSMENT — ENCOUNTER SYMPTOMS
COUGH: 0
SHORTNESS OF BREATH: 0

## 2021-01-05 ASSESSMENT — PATIENT HEALTH QUESTIONNAIRE - PHQ9
SUM OF ALL RESPONSES TO PHQ QUESTIONS 1-9: 2
SUM OF ALL RESPONSES TO PHQ QUESTIONS 1-9: 2

## 2021-01-05 NOTE — PROGRESS NOTES
Patient is present for f/u from [facility]   Patient states she went due to syncope and low bp  Patient states she is starting to feel better    Patient is requesting a shower chair  Patient states she feels lightheaded and weak sometimes

## 2021-01-05 NOTE — PROGRESS NOTES
VISIT LOCATION: Home    TELEHEALTH EVALUATION -- Audio/Visual (During GFWHB-52 public health emergency)    Due to COVID 23 outbreak, patient's office visit was converted to a virtual visit. Patient was contacted and agreed to proceed with a virtual visit via [employer]. me  The risks and benefits of converting to a virtual visit were discussed in light of the current infectious disease epidemic. Patient also understood that insurance coverage and co-pays are up to their individual insurance plans. Nata Kirill (:  1960) has requested an audio/video evaluation for the following concern(s):    Chief Complaint:   HPI:  Arely Mcgowan states she had a syncopal episode and went to the er. She states they gave her fluids-  She had a ct scan. We do not currently have any records available,they have been requested. She is feeling better. She is taking otc gut health meds, states it helps. Denies fever. She states sometimes she doesn't feel well after a bowel movement like it just \"drains all the nutrients out of her\". She is an established pt with gi. She feels she lost some more weight but gained some back since her er visit. Send order for shower chair to [facility]. Reminded about pending ultrasound and ct scan    Wt Readings from Last 3 Encounters:   20 120 lb 3.2 oz (54.5 kg)   10/10/19 118 lb (53.5 kg)   19 116 lb 9.6 oz (52.9 kg)         Review of Systems   Constitutional: Negative for chills and fever. Respiratory: Negative for cough and shortness of breath. Cardiovascular: Negative for chest pain, palpitations and leg swelling. Prior to Visit Medications    Medication Sig Taking? Authorizing Provider   ibuprofen (ADVIL;MOTRIN) 800 MG tablet take 1 tablet by mouth four times a day if needed for pain Yes Adrián Jean, APRN - CNP   Cyanocobalamin (B-12 PO) Take by mouth Yes Historical Provider, MD   Misc.  Devices (BED WEDGE) MISC 1 Device by Does not apply route once for 1 dose Yes Twila Young APRN - CNP   fluticasone (FLONASE) 50 MCG/ACT nasal spray instill 2 sprays into each nostril once daily Yes Queta Denton MD   lidocaine (XYLOCAINE) 5 % ointment Apply topically as needed. Yes Isrrael Schumacher MD   capsaicin (ZOSTRIX) 0.025 % cream Apply topically 2 times daily Apply topically 2 times daily. Yes Historical Provider, MD   b complex vitamins capsule Take 1 capsule by mouth daily Yes Historical Provider, MD   NONFORMULARY Herbal digestive suppliment Yes Historical Provider, MD   NONFORMULARY instaflex supplement for joints Yes Historical Provider, MD   Elastic Bandages & Supports (LUMBAR BACK BRACE/SUPPORT PAD) MISC 1 each by Does not apply route daily as needed (back pain) Yes Isrrael Schumacher MD   acetaminophen (TYLENOL) 500 MG tablet Take 500 mg by mouth every 6 hours as needed. 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    Syncopal episodes    ,   Past Surgical History:   Procedure Laterality Date    COLONOSCOPY      HYSTERECTOMY      total    TOOTH EXTRACTION Left 10/2014    [facility]    UPPER GASTROINTESTINAL ENDOSCOPY               [] OTHER:    Constitutional: [x] Appears well-developed and well-nourished [] No apparent distress                            [] Abnormal-   Mental status  [x] Alert and awake  [x] Oriented to person/place/time [x]Able to follow commands       Eyes:  EOM    [x]  Normal  [] Abnormal-  Sclera  [x]  Normal  [] Abnormal -         Discharge [x]  None visible  [] Abnormal -     HENT:   [x] Normocephalic, atraumatic.   [] Abnormal   [] Mouth/Throat: Mucous membranes are moist.      External Ears [x] Normal  [] Abnormal-      Neck: [x] No visualized mass

## 2021-01-07 ENCOUNTER — TELEPHONE (OUTPATIENT)
Dept: FAMILY MEDICINE CLINIC | Age: 61
End: 2021-01-07

## 2021-01-07 NOTE — TELEPHONE ENCOUNTER
White blood cell count low, encourage to f/u with hem/onc   ----- Message -----   From: Geraldnie Aggarwal MA   Sent: 1/5/2021   2:18 PM EST   To: ELISHA Quiroga - CNP   Subject: Scan                                               The image below was scanned by Geraldine Aggarwal, [address] [place] [address] on 1/5/2021 at 2:18 PM to the following: Chris Morgan [[id number]]: Informed patient, verbally understood.

## 2021-02-02 ENCOUNTER — TELEPHONE (OUTPATIENT)
Dept: GASTROENTEROLOGY | Age: 61
End: 2021-02-02

## 2021-02-02 NOTE — TELEPHONE ENCOUNTER
Yolanda Davila (the patients )  from [place] [address] called and asked if the patient has been in contact with us due to recent abd pain. Writer attempted to return Sujata's call and got her voicemail. Writer noted no recent communication with the patient and she hasn't been seen in the office since 2019 and prior to that was 2014. Cj Comment if patient needs anything to contact us at [phone] and if patient would like to schedule a appointment.

## 2021-04-29 ENCOUNTER — VIRTUAL VISIT (OUTPATIENT)
Dept: FAMILY MEDICINE CLINIC | Age: 61
End: 2021-04-29
Payer: COMMERCIAL

## 2021-04-29 DIAGNOSIS — M54.50 LUMBAR PAIN: Primary | ICD-10-CM

## 2021-04-29 DIAGNOSIS — Z12.31 ENCOUNTER FOR SCREENING MAMMOGRAM FOR BREAST CANCER: ICD-10-CM

## 2021-04-29 PROCEDURE — 3017F COLORECTAL CA SCREEN DOC REV: CPT | Performed by: NURSE PRACTITIONER

## 2021-04-29 PROCEDURE — G8427 DOCREV CUR MEDS BY ELIG CLIN: HCPCS | Performed by: NURSE PRACTITIONER

## 2021-04-29 PROCEDURE — 99213 OFFICE O/P EST LOW 20 MIN: CPT | Performed by: NURSE PRACTITIONER

## 2021-04-29 ASSESSMENT — ENCOUNTER SYMPTOMS
COUGH: 0
SHORTNESS OF BREATH: 0
BACK PAIN: 1

## 2021-04-29 NOTE — PROGRESS NOTES
Patient is present to discuss medication for lower back pain  Patient states this has been going on for years but feels it is getting worse the last few weeks  Patient states she has been using creams, ibuprofen, tylenol, aleve with no relief  Patient is asking if she can be described diclofenac

## 2021-04-29 NOTE — PROGRESS NOTES
VISIT LOCATION: Home    TELEHEALTH EVALUATION -- Audio/Visual (During WCVRG-70 public health emergency)    Due to COVID 23 outbreak, patient's office visit was converted to a virtual visit. Patient was contacted and agreed to proceed with a virtual visit via [employer]. me  The risks and benefits of converting to a virtual visit were discussed in light of the current infectious disease epidemic. Patient also understood that insurance coverage and co-pays are up to their individual insurance plans. Leann Rivera (:  1960) has requested an audio/video evaluation for the following concern(s):    Chief Complaint:   HPI:  Patient is present to discuss medication for lower back pain  Patient states this has been going on for years but feels it is getting worse the last few weeks- she feels the change of weather has made her pain worse over the past few weeks-  and she is eating too many inflammatory foods. Denies red flag sxs. Patient states she has been using creams, ibuprofen, tylenol, aleve with no relief- the meds help minimally   Patient is asking if she can be described diclofenac   Labs viewed from [facility] records in January       Review of Systems   Constitutional: Negative for chills and fever. Respiratory: Negative for cough and shortness of breath. Cardiovascular: Negative for chest pain, palpitations and leg swelling. Musculoskeletal: Positive for back pain. Prior to Visit Medications    Medication Sig Taking? Authorizing Provider   vitamin D (CHOLECALCIFEROL) 25 MCG (1000 UT) TABS tablet Take 1,000 Units by mouth daily Yes Historical Provider, MD   diclofenac (VOLTAREN) 50 MG EC tablet Take 1 tablet by mouth 2 times daily Yes ELISHA Fortune CNP   ibuprofen (ADVIL;MOTRIN) 800 MG tablet take 1 tablet by mouth four times a day if needed for pain Yes ELISHA Fortune - CNP   Cyanocobalamin (B-12 PO) Take by mouth Yes Historical Provider, MD   Misc.  Devices (BED WEDGE) MISC 1 Device by Does not apply route once for 1 dose Yes ELISHA Monterroso CNP   fluticasone (FLONASE) 50 MCG/ACT nasal spray instill 2 sprays into each nostril once daily Yes Ruth Keys MD   lidocaine (XYLOCAINE) 5 % ointment Apply topically as needed. Yes Ruth Keys MD   capsaicin (ZOSTRIX) 0.025 % cream Apply topically 2 times daily Apply topically 2 times daily. Yes Historical Provider, MD   b complex vitamins capsule Take 1 capsule by mouth daily Yes Historical Provider, MD   NONFORMULARY Herbal digestive suppliment Yes Historical Provider, MD   NONFORMULARY instaflex supplement for joints Yes Historical Provider, MD   Elastic Bandages & Supports (LUMBAR BACK BRACE/SUPPORT PAD) MISC 1 each by Does not apply route daily as needed (back pain) Yes Ruth Keys MD   acetaminophen (TYLENOL) 500 MG tablet Take 500 mg by mouth every 6 hours as needed. 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    Syncopal episodes    ,   Past Surgical History:   Procedure Laterality Date    COLONOSCOPY      HYSTERECTOMY      total    TOOTH EXTRACTION Left 10/2014    [facility]    UPPER GASTROINTESTINAL ENDOSCOPY               [] OTHER:    Constitutional: [x] Appears well-developed and well-nourished [] No apparent distress                            [] Abnormal-   Mental status  [x] Alert and awake  [x] Oriented to person/place/time [x]Able to follow commands       Eyes:  EOM    [x]  Normal  [] Abnormal-  Sclera  [x]  Normal  [] Abnormal -         Discharge [x]  None visible  [] Abnormal -     HENT:   [x] Normocephalic, atraumatic.   [] Abnormal   [] Mouth/Throat: Mucous membranes are moist.      External Ears [x] Normal  [] Abnormal-      Neck: [x] No visualized mass      Pulmonary/Chest: [x] Respiratory effort normal.  [] No visualized signs of difficulty breathing or respiratory distress        [] Abnormal-      Musculoskeletal:   [] Normal gait with no signs of ataxia         [x] Normal range of motion of neck        [] Abnormal-   [] Motor grossly intact in visible upper extremities    [] Motor grossly intact in visible lower extremities        Neurological:        [x] No Facial Asymmetry (Cranial nerve 7 motor function) (limited exam to video visit)                       [x] No gaze palsy        [] Abnormal-         Skin:                     [x] No significant exanthematous lesions or discoloration noted on facial skin         [] Abnormal-                                  Psychiatric:           [x] Normal Affect [] No Hallucinations        [] Abnormal- [] Normal Mood  [] Anxious appearing    [] Depressed appearing  [] Confused appearing      Due to this being a TeleHealth encounter, evaluation of the following organ systems is limited: Vitals/Constitutional/EENT/Resp/CV/GI//MS/Neuro/Skin/Heme-Lymph-Imm. ASSESSMENT/PLAN:  Encounter Diagnoses   Name Primary?  Lumbar pain Yes    Encounter for screening mammogram for breast cancer        Orders Placed This Encounter   Medications    diclofenac (VOLTAREN) 50 MG EC tablet     Sig: Take 1 tablet by mouth 2 times daily     Dispense:  60 tablet     Refill:  2         No follow-ups on file. An  electronic signature was used to authenticate this note.     --ELISHA De La Cruz - CNP on 4/29/2021 at 1:11 PM        Pursuant to the emergency declaration under the [facility], [facility] waiver authority and the [employer] Act, this Virtual  Visit was conducted, with patient's consent, to reduce the patient's risk of exposure to COVID-19 and provide continuity of care for an established patient. Services were provided through a telephone discussion virtually to substitute for in-person clinic visit.

## 2021-11-05 RX ORDER — CAPSAICIN 0.025 %
CREAM (GRAM) TOPICAL
Qty: 60 G | OUTPATIENT
Start: 2021-11-05

## 2021-11-05 NOTE — TELEPHONE ENCOUNTER
This is an OTC product so she can pick it up without an rx, but she has not been seen since 2019 so I cannot prescribe it

## 2022-07-21 RX ORDER — FLUTICASONE PROPIONATE 50 MCG
SPRAY, SUSPENSION (ML) NASAL
Qty: 16 G | Refills: 5 | OUTPATIENT
Start: 2022-07-21

## 2023-02-08 ENCOUNTER — APPOINTMENT (OUTPATIENT)
Dept: GENERAL RADIOLOGY | Age: 63
End: 2023-02-08
Payer: COMMERCIAL

## 2023-02-08 ENCOUNTER — HOSPITAL ENCOUNTER (EMERGENCY)
Age: 63
Discharge: HOME | End: 2023-02-08
Attending: EMERGENCY MEDICINE
Payer: COMMERCIAL

## 2023-02-08 VITALS
SYSTOLIC BLOOD PRESSURE: 125 MMHG | HEART RATE: 68 BPM | RESPIRATION RATE: 14 BRPM | OXYGEN SATURATION: 100 % | TEMPERATURE: 98.3 F | DIASTOLIC BLOOD PRESSURE: 62 MMHG

## 2023-02-08 DIAGNOSIS — M54.50 ACUTE LOW BACK PAIN WITHOUT SCIATICA, UNSPECIFIED BACK PAIN LATERALITY: ICD-10-CM
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
## 2023-02-08 DIAGNOSIS — M25.559 HIP PAIN: ICD-10-CM

## 2023-02-08 PROCEDURE — 99283 EMERGENCY DEPT VISIT LOW MDM: CPT

## 2023-02-08 PROCEDURE — 6370000000 HC RX 637 (ALT 250 FOR IP): Performed by: EMERGENCY MEDICINE

## 2023-02-08 PROCEDURE — 72100 X-RAY EXAM L-S SPINE 2/3 VWS: CPT

## 2023-02-08 PROCEDURE — 73502 X-RAY EXAM HIP UNI 2-3 VIEWS: CPT

## 2023-02-08 RX ORDER — IBUPROFEN 800 MG/1
800 TABLET ORAL ONCE
Status: COMPLETED | OUTPATIENT
Start: 2023-02-08 | End: 2023-02-08

## 2023-02-08 RX ORDER — METHOCARBAMOL 500 MG/1
500 TABLET, FILM COATED ORAL 4 TIMES DAILY
Qty: 40 TABLET | Refills: 0 | Status: SHIPPED | OUTPATIENT
Start: 2023-02-08 | End: 2023-02-18

## 2023-02-08 RX ADMIN — IBUPROFEN 800 MG: 800 TABLET, FILM COATED ORAL at 21:18

## 2023-02-08 ASSESSMENT — ENCOUNTER SYMPTOMS
COLOR CHANGE: 0
BACK PAIN: 1
VOMITING: 0
EYES NEGATIVE: 1
ABDOMINAL DISTENTION: 0
DIARRHEA: 0
SINUS PAIN: 0
SHORTNESS OF BREATH: 0
APNEA: 0
CONSTIPATION: 0
CHEST TIGHTNESS: 0

## 2023-02-08 ASSESSMENT — PAIN - FUNCTIONAL ASSESSMENT: PAIN_FUNCTIONAL_ASSESSMENT: 0-10

## 2023-02-08 ASSESSMENT — PAIN DESCRIPTION - DESCRIPTORS: DESCRIPTORS: DISCOMFORT

## 2023-02-08 ASSESSMENT — PAIN DESCRIPTION - FREQUENCY: FREQUENCY: CONTINUOUS

## 2023-02-08 ASSESSMENT — PAIN DESCRIPTION - LOCATION: LOCATION: HIP;BACK

## 2023-02-08 ASSESSMENT — PAIN DESCRIPTION - PAIN TYPE: TYPE: ACUTE PAIN

## 2023-02-08 ASSESSMENT — PAIN DESCRIPTION - ORIENTATION: ORIENTATION: LEFT
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
## 2023-05-11 RX ORDER — IBUPROFEN 800 MG/1
TABLET ORAL
Qty: 90 TABLET | Refills: 1 | OUTPATIENT
Start: 2023-05-11